# Patient Record
Sex: FEMALE | NOT HISPANIC OR LATINO | Employment: FULL TIME | URBAN - METROPOLITAN AREA
[De-identification: names, ages, dates, MRNs, and addresses within clinical notes are randomized per-mention and may not be internally consistent; named-entity substitution may affect disease eponyms.]

---

## 2017-03-08 ENCOUNTER — HOSPITAL ENCOUNTER (OUTPATIENT)
Dept: FAMILY MEDICINE CLINIC | Facility: CLINIC | Age: 27
Setting detail: SPECIMEN
Discharge: HOME | End: 2017-03-08

## 2017-03-08 LAB
ALBUMIN SERPL-MCNC: 3.6 G/DL (ref 3.5–4.8)
ALBUMIN/GLOB SERPL: 1.1 {RATIO} (ref 1–1.7)
ALP SERPL-CCNC: 44 IU/L (ref 32–91)
ALT SERPL-CCNC: 29 IU/L (ref 14–54)
ANION GAP SERPL CALC-SCNC: 14.7 MMOL/L (ref 10–20)
AST SERPL-CCNC: 34 IU/L (ref 15–41)
BASOPHILS # BLD AUTO: 0.1 10*3/UL (ref 0–0.2)
BASOPHILS NFR BLD AUTO: 1 % (ref 0–2)
BILIRUB SERPL-MCNC: 0.4 MG/DL (ref 0.3–1.2)
BUN SERPL-MCNC: 6 MG/DL (ref 8–20)
BUN/CREAT SERPL: 10 (ref 5.4–26.2)
CALCIUM SERPL-MCNC: 9.2 MG/DL (ref 8.9–10.3)
CHLORIDE SERPL-SCNC: 103 MMOL/L (ref 101–111)
CONV CO2: 25 MMOL/L (ref 22–32)
CONV TOTAL PROTEIN: 6.8 G/DL (ref 6.1–7.9)
CREAT UR-MCNC: 0.6 MG/DL (ref 0.4–1)
DIFFERENTIAL METHOD BLD: (no result)
EOSINOPHIL # BLD AUTO: 0.3 10*3/UL (ref 0–0.3)
EOSINOPHIL # BLD AUTO: 3 % (ref 0–3)
ERYTHROCYTE [DISTWIDTH] IN BLOOD BY AUTOMATED COUNT: 13.7 % (ref 11.5–14.5)
GLOBULIN UR ELPH-MCNC: 3.2 G/DL (ref 2.5–3.8)
GLUCOSE SERPL-MCNC: 145 MG/DL (ref 65–99)
HCT VFR BLD AUTO: 39.2 % (ref 35–49)
HGB BLD-MCNC: 13.4 G/DL (ref 12–15)
LYMPHOCYTES # BLD AUTO: 3.7 10*3/UL (ref 0.8–4.8)
LYMPHOCYTES NFR BLD AUTO: 37 % (ref 18–42)
MCH RBC QN AUTO: 28.8 PG (ref 26–32)
MCHC RBC AUTO-ENTMCNC: 34 G/DL (ref 32–36)
MCV RBC AUTO: 84.7 FL (ref 80–94)
MONOCYTES # BLD AUTO: 0.5 10*3/UL (ref 0.1–1.3)
MONOCYTES NFR BLD AUTO: 6 % (ref 2–11)
NEUTROPHILS # BLD AUTO: 5.4 10*3/UL (ref 2.3–8.6)
NEUTROPHILS NFR BLD AUTO: 53 % (ref 50–75)
NRBC BLD AUTO-RTO: 0 /100{WBCS}
NRBC/RBC NFR BLD MANUAL: 0 10*3/UL
PLATELET # BLD AUTO: 324 10*3/UL (ref 150–450)
PMV BLD AUTO: 9.3 FL (ref 7.4–10.4)
POTASSIUM SERPL-SCNC: 3.7 MMOL/L (ref 3.6–5.1)
RBC # BLD AUTO: 4.64 10*6/UL (ref 4–5.4)
SODIUM SERPL-SCNC: 139 MMOL/L (ref 136–144)
TSH SERPL-ACNC: 3.55 UIU/ML (ref 0.34–5.6)
WBC # BLD AUTO: 10 10*3/UL (ref 4.5–11.5)

## 2018-05-29 ENCOUNTER — HOSPITAL ENCOUNTER (OUTPATIENT)
Dept: FAMILY MEDICINE CLINIC | Facility: CLINIC | Age: 28
Setting detail: SPECIMEN
Discharge: HOME | End: 2018-05-29
Attending: NURSE PRACTITIONER | Admitting: NURSE PRACTITIONER

## 2018-05-29 LAB
ANION GAP SERPL CALC-SCNC: 12.6 MMOL/L (ref 10–20)
BUN SERPL-MCNC: 5 MG/DL (ref 8–20)
BUN/CREAT SERPL: 8.3 (ref 5.4–26.2)
C TRACH RRNA SPEC QL PROBE: NORMAL
CALCIUM SERPL-MCNC: 9.1 MG/DL (ref 8.9–10.3)
CHLORIDE SERPL-SCNC: 100 MMOL/L (ref 101–111)
CHOLEST SERPL-MCNC: 209 MG/DL
CHOLEST/HDLC SERPL: 5.2 {RATIO}
CONV CO2: 27 MMOL/L (ref 22–32)
CONV LDL CHOLESTEROL DIRECT: 131 MG/DL (ref 0–100)
CREAT UR-MCNC: 0.6 MG/DL (ref 0.4–1)
GLUCOSE SERPL-MCNC: 184 MG/DL (ref 65–99)
HDLC SERPL-MCNC: 40 MG/DL
LDLC/HDLC SERPL: 3.3 {RATIO}
LIPID INTERPRETATION: ABNORMAL
N GONORRHOEA RRNA SPEC QL PROBE: NORMAL
POTASSIUM SERPL-SCNC: 3.6 MMOL/L (ref 3.6–5.1)
SODIUM SERPL-SCNC: 136 MMOL/L (ref 136–144)
SPECIMEN SOURCE: NORMAL
TRIGL SERPL-MCNC: 260 MG/DL
VLDLC SERPL CALC-MCNC: 37.8 MG/DL

## 2018-12-18 ENCOUNTER — HOSPITAL ENCOUNTER (OUTPATIENT)
Dept: FAMILY MEDICINE CLINIC | Facility: CLINIC | Age: 28
Setting detail: SPECIMEN
Discharge: HOME | End: 2018-12-18
Attending: FAMILY MEDICINE | Admitting: FAMILY MEDICINE

## 2018-12-20 LAB
BACTERIA SPEC AEROBE CULT: NORMAL
Lab: NORMAL
MICRO REPORT STATUS: NORMAL
SPECIMEN SOURCE: NORMAL

## 2019-03-12 ENCOUNTER — HOSPITAL ENCOUNTER (OUTPATIENT)
Dept: OTHER | Facility: HOSPITAL | Age: 29
Discharge: HOME | End: 2019-03-12
Attending: OBSTETRICS & GYNECOLOGY | Admitting: OBSTETRICS & GYNECOLOGY

## 2019-03-12 LAB
ALBUMIN SERPL-MCNC: 4 G/DL (ref 3.5–4.8)
ALBUMIN/GLOB SERPL: 1.1 {RATIO} (ref 1–1.7)
ALP SERPL-CCNC: 54 IU/L (ref 32–91)
ALT SERPL-CCNC: 43 IU/L (ref 14–54)
ANION GAP SERPL CALC-SCNC: 17.9 MMOL/L (ref 10–20)
AST SERPL-CCNC: 43 IU/L (ref 15–41)
BASOPHILS # BLD AUTO: 0.1 10*3/UL (ref 0–0.2)
BASOPHILS NFR BLD AUTO: 1 % (ref 0–2)
BILIRUB SERPL-MCNC: 0.6 MG/DL (ref 0.3–1.2)
BUN SERPL-MCNC: 6 MG/DL (ref 8–20)
BUN/CREAT SERPL: 12 (ref 5.4–26.2)
CALCIUM SERPL-MCNC: 9 MG/DL (ref 8.9–10.3)
CHLORIDE SERPL-SCNC: 95 MMOL/L (ref 101–111)
CONV CO2: 22 MMOL/L (ref 22–32)
CONV TOTAL PROTEIN: 7.6 G/DL (ref 6.1–7.9)
CREAT UR-MCNC: 0.5 MG/DL (ref 0.4–1)
DIFFERENTIAL METHOD BLD: (no result)
EOSINOPHIL # BLD AUTO: 0.3 10*3/UL (ref 0–0.3)
EOSINOPHIL # BLD AUTO: 3 % (ref 0–3)
ERYTHROCYTE [DISTWIDTH] IN BLOOD BY AUTOMATED COUNT: 15.3 % (ref 11.5–14.5)
GLOBULIN UR ELPH-MCNC: 3.6 G/DL (ref 2.5–3.8)
GLUCOSE SERPL-MCNC: 314 MG/DL (ref 65–99)
HCT VFR BLD AUTO: 32.1 % (ref 35–49)
HGB BLD-MCNC: 10 G/DL (ref 12–15)
LYMPHOCYTES # BLD AUTO: 2.8 10*3/UL (ref 0.8–4.8)
LYMPHOCYTES NFR BLD AUTO: 31 % (ref 18–42)
MCH RBC QN AUTO: 24.1 PG (ref 26–32)
MCHC RBC AUTO-ENTMCNC: 31.2 G/DL (ref 32–36)
MCV RBC AUTO: 77.2 FL (ref 80–94)
MONOCYTES # BLD AUTO: 0.6 10*3/UL (ref 0.1–1.3)
MONOCYTES NFR BLD AUTO: 7 % (ref 2–11)
NEUTROPHILS # BLD AUTO: 5.4 10*3/UL (ref 2.3–8.6)
NEUTROPHILS NFR BLD AUTO: 58 % (ref 50–75)
NRBC BLD AUTO-RTO: 0 /100{WBCS}
NRBC/RBC NFR BLD MANUAL: 0 10*3/UL
PLATELET # BLD AUTO: 415 10*3/UL (ref 150–450)
PMV BLD AUTO: 8.3 FL (ref 7.4–10.4)
POTASSIUM SERPL-SCNC: 3.9 MMOL/L (ref 3.6–5.1)
RBC # BLD AUTO: 4.16 10*6/UL (ref 4–5.4)
SODIUM SERPL-SCNC: 131 MMOL/L (ref 136–144)
WBC # BLD AUTO: 9.2 10*3/UL (ref 4.5–11.5)

## 2019-03-18 ENCOUNTER — HOSPITAL ENCOUNTER (OUTPATIENT)
Dept: OTHER | Facility: HOSPITAL | Age: 29
Setting detail: SPECIMEN
Discharge: HOME | End: 2019-03-18
Attending: OBSTETRICS & GYNECOLOGY | Admitting: OBSTETRICS & GYNECOLOGY

## 2019-07-31 ENCOUNTER — OFFICE VISIT (OUTPATIENT)
Dept: FAMILY MEDICINE CLINIC | Facility: CLINIC | Age: 29
End: 2019-07-31

## 2019-07-31 ENCOUNTER — TELEPHONE (OUTPATIENT)
Dept: RHEUMATOLOGY | Facility: CLINIC | Age: 29
End: 2019-07-31

## 2019-07-31 ENCOUNTER — LAB (OUTPATIENT)
Dept: FAMILY MEDICINE CLINIC | Facility: CLINIC | Age: 29
End: 2019-07-31

## 2019-07-31 VITALS
DIASTOLIC BLOOD PRESSURE: 114 MMHG | WEIGHT: 215.3 LBS | OXYGEN SATURATION: 98 % | SYSTOLIC BLOOD PRESSURE: 167 MMHG | BODY MASS INDEX: 34.6 KG/M2 | HEART RATE: 105 BPM | HEIGHT: 66 IN

## 2019-07-31 DIAGNOSIS — N94.10 DYSPAREUNIA, FEMALE: ICD-10-CM

## 2019-07-31 DIAGNOSIS — I10 HYPERTENSION, UNSPECIFIED TYPE: ICD-10-CM

## 2019-07-31 DIAGNOSIS — L02.91 ABSCESS: ICD-10-CM

## 2019-07-31 DIAGNOSIS — Z00.00 PREVENTATIVE HEALTH CARE: ICD-10-CM

## 2019-07-31 DIAGNOSIS — E11.65 TYPE 2 DIABETES MELLITUS WITH HYPERGLYCEMIA, WITHOUT LONG-TERM CURRENT USE OF INSULIN (HCC): ICD-10-CM

## 2019-07-31 DIAGNOSIS — E66.9 CLASS 1 OBESITY WITH BODY MASS INDEX (BMI) OF 34.0 TO 34.9 IN ADULT, UNSPECIFIED OBESITY TYPE, UNSPECIFIED WHETHER SERIOUS COMORBIDITY PRESENT: ICD-10-CM

## 2019-07-31 DIAGNOSIS — Z00.00 PREVENTATIVE HEALTH CARE: Primary | ICD-10-CM

## 2019-07-31 PROBLEM — L72.3 INFECTED SEBACEOUS CYST: Status: ACTIVE | Noted: 2019-06-06

## 2019-07-31 PROBLEM — Z11.8 SPECIAL SCREENING EXAMINATION FOR OTHER SPECIFIED CHLAMYDIAL DISEASES: Status: ACTIVE | Noted: 2018-05-29

## 2019-07-31 PROBLEM — G44.209 TENSION HEADACHE: Status: ACTIVE | Noted: 2017-03-08

## 2019-07-31 PROBLEM — L08.9 INFECTED SEBACEOUS CYST: Status: ACTIVE | Noted: 2019-06-06

## 2019-07-31 LAB
ALBUMIN SERPL-MCNC: 3.9 G/DL (ref 3.5–4.8)
ALBUMIN/GLOB SERPL: 1 G/DL (ref 1–1.7)
ALP SERPL-CCNC: 69 U/L (ref 32–91)
ALT SERPL W P-5'-P-CCNC: 99 U/L (ref 14–54)
ANION GAP SERPL CALCULATED.3IONS-SCNC: 15.8 MMOL/L (ref 5–15)
ARTICHOKE IGE QN: 162 MG/DL (ref 0–100)
AST SERPL-CCNC: 82 U/L (ref 15–41)
BASOPHILS # BLD AUTO: 0.1 10*3/MM3 (ref 0–0.2)
BASOPHILS NFR BLD AUTO: 1.1 % (ref 0–1.5)
BILIRUB SERPL-MCNC: 0.7 MG/DL (ref 0.3–1.2)
BUN BLD-MCNC: 4 MG/DL (ref 8–20)
BUN/CREAT SERPL: 6.7 (ref 5.4–26.2)
C TRACH RRNA CVX QL NAA+PROBE: NOT DETECTED
CALCIUM SPEC-SCNC: 9.3 MG/DL (ref 8.9–10.3)
CHLORIDE SERPL-SCNC: 97 MMOL/L (ref 101–111)
CHOLEST SERPL-MCNC: 210 MG/DL
CLUE CELLS SPEC QL WET PREP: ABNORMAL
CO2 SERPL-SCNC: 25 MMOL/L (ref 22–32)
CREAT BLD-MCNC: 0.6 MG/DL (ref 0.4–1)
DEPRECATED RDW RBC AUTO: 46.4 FL (ref 37–54)
EOSINOPHIL # BLD AUTO: 0.2 10*3/MM3 (ref 0–0.4)
EOSINOPHIL NFR BLD AUTO: 3.3 % (ref 0.3–6.2)
ERYTHROCYTE [DISTWIDTH] IN BLOOD BY AUTOMATED COUNT: 16.8 % (ref 12.3–15.4)
GFR SERPL CREATININE-BSD FRML MDRD: 118 ML/MIN/1.73
GFR SERPL CREATININE-BSD FRML MDRD: 143 ML/MIN/1.73
GLOBULIN UR ELPH-MCNC: 3.9 GM/DL (ref 2.5–3.8)
GLUCOSE BLD-MCNC: 344 MG/DL (ref 65–99)
HBA1C MFR BLD: 12.5 % (ref 3.5–5.6)
HCT VFR BLD AUTO: 44 % (ref 34–46.6)
HDLC SERPL QL: 5.12
HDLC SERPL-MCNC: 41 MG/DL
HGB BLD-MCNC: 14.2 G/DL (ref 12–15.9)
HYDATID CYST SPEC WET PREP: ABNORMAL
LDLC/HDLC SERPL: 3.32 {RATIO}
LYMPHOCYTES # BLD AUTO: 2.8 10*3/MM3 (ref 0.7–3.1)
LYMPHOCYTES NFR BLD AUTO: 40.6 % (ref 19.6–45.3)
MCH RBC QN AUTO: 25.2 PG (ref 26.6–33)
MCHC RBC AUTO-ENTMCNC: 32.2 G/DL (ref 31.5–35.7)
MCV RBC AUTO: 78.3 FL (ref 79–97)
MONOCYTES # BLD AUTO: 0.5 10*3/MM3 (ref 0.1–0.9)
MONOCYTES NFR BLD AUTO: 7.1 % (ref 5–12)
N GONORRHOEA RRNA SPEC QL NAA+PROBE: NOT DETECTED
NEUTROPHILS # BLD AUTO: 3.4 10*3/MM3 (ref 1.7–7)
NEUTROPHILS NFR BLD AUTO: 47.9 % (ref 42.7–76)
NRBC BLD AUTO-RTO: 0.2 /100 WBC (ref 0–0.2)
PLATELET # BLD AUTO: 350 10*3/MM3 (ref 140–450)
PMV BLD AUTO: 8.9 FL (ref 6–12)
POTASSIUM BLD-SCNC: 3.8 MMOL/L (ref 3.6–5.1)
PROT SERPL-MCNC: 7.8 G/DL (ref 6.1–7.9)
RBC # BLD AUTO: 5.62 10*6/MM3 (ref 3.77–5.28)
SODIUM BLD-SCNC: 134 MMOL/L (ref 136–144)
T VAGINALIS SPEC QL WET PREP: ABNORMAL
TRIGL SERPL-MCNC: 165 MG/DL
TSH SERPL DL<=0.05 MIU/L-ACNC: 1.48 MIU/ML (ref 0.34–5.6)
VLDLC SERPL-MCNC: 33 MG/DL
WBC NRBC COR # BLD: 7 10*3/MM3 (ref 3.4–10.8)
WBC SPEC QL WET PREP: ABNORMAL
YEAST GENITAL QL WET PREP: ABNORMAL

## 2019-07-31 PROCEDURE — 99395 PREV VISIT EST AGE 18-39: CPT | Performed by: NURSE PRACTITIONER

## 2019-07-31 PROCEDURE — 83036 HEMOGLOBIN GLYCOSYLATED A1C: CPT | Performed by: NURSE PRACTITIONER

## 2019-07-31 PROCEDURE — 87210 SMEAR WET MOUNT SALINE/INK: CPT | Performed by: NURSE PRACTITIONER

## 2019-07-31 PROCEDURE — 87491 CHLMYD TRACH DNA AMP PROBE: CPT | Performed by: NURSE PRACTITIONER

## 2019-07-31 PROCEDURE — 87591 N.GONORRHOEAE DNA AMP PROB: CPT | Performed by: NURSE PRACTITIONER

## 2019-07-31 PROCEDURE — 80053 COMPREHEN METABOLIC PANEL: CPT | Performed by: NURSE PRACTITIONER

## 2019-07-31 PROCEDURE — 85025 COMPLETE CBC W/AUTO DIFF WBC: CPT | Performed by: NURSE PRACTITIONER

## 2019-07-31 PROCEDURE — 87077 CULTURE AEROBIC IDENTIFY: CPT | Performed by: NURSE PRACTITIONER

## 2019-07-31 PROCEDURE — 84443 ASSAY THYROID STIM HORMONE: CPT | Performed by: NURSE PRACTITIONER

## 2019-07-31 PROCEDURE — 80061 LIPID PANEL: CPT | Performed by: NURSE PRACTITIONER

## 2019-07-31 PROCEDURE — 87070 CULTURE OTHR SPECIMN AEROBIC: CPT | Performed by: NURSE PRACTITIONER

## 2019-07-31 PROCEDURE — 87205 SMEAR GRAM STAIN: CPT | Performed by: NURSE PRACTITIONER

## 2019-07-31 PROCEDURE — 36415 COLL VENOUS BLD VENIPUNCTURE: CPT

## 2019-07-31 PROCEDURE — 87186 SC STD MICRODIL/AGAR DIL: CPT | Performed by: NURSE PRACTITIONER

## 2019-07-31 RX ORDER — AMLODIPINE BESYLATE 10 MG/1
10 TABLET ORAL DAILY
Qty: 90 TABLET | Refills: 1 | Status: SHIPPED | OUTPATIENT
Start: 2019-07-31 | End: 2019-12-18

## 2019-07-31 RX ORDER — ESTRADIOL 0.5 MG/1
TABLET ORAL
Qty: 18 TABLET | Refills: 0 | Status: SHIPPED | OUTPATIENT
Start: 2019-07-31 | End: 2019-12-18

## 2019-07-31 RX ORDER — AMLODIPINE BESYLATE 10 MG/1
10 TABLET ORAL DAILY
Qty: 30 TABLET | Refills: 2 | Status: SHIPPED | OUTPATIENT
Start: 2019-07-31 | End: 2019-07-31 | Stop reason: SDUPTHER

## 2019-07-31 RX ORDER — AMLODIPINE BESYLATE 5 MG/1
TABLET ORAL
COMMUNITY
Start: 2016-02-02 | End: 2019-07-31

## 2019-07-31 RX ORDER — ESTRADIOL 0.5 MG/1
TABLET ORAL
Qty: 15 TABLET | Refills: 1 | Status: SHIPPED | OUTPATIENT
Start: 2019-07-31 | End: 2019-07-31 | Stop reason: SDUPTHER

## 2019-07-31 NOTE — TELEPHONE ENCOUNTER
[facility] is requesting 90 day supply on pt amlodipine and estradiol.   Pt also is requesting script for whatever meter/test strips/lancets are covered per insurance. Faxed script to pharmacy.

## 2019-07-31 NOTE — TELEPHONE ENCOUNTER
Spoke with Hilda Acosta NP regarding posterior neck cyst that was infected and draining pus. Hilda asked that we see the patient in our office today. Spoke with patient and offered multiple appointment dates and times. Patient is refusing to be seen. She stated that she could not do it financially right now. Explained to the patient that this would probably get a lot worse without some type of intervention. Patient understood and said that she would call us back if she changed her mind.

## 2019-08-01 ENCOUNTER — DOCUMENTATION (OUTPATIENT)
Dept: FAMILY MEDICINE CLINIC | Facility: CLINIC | Age: 29
End: 2019-08-01

## 2019-08-01 DIAGNOSIS — E11.65 TYPE 2 DIABETES MELLITUS WITH HYPERGLYCEMIA, WITHOUT LONG-TERM CURRENT USE OF INSULIN (HCC): Primary | ICD-10-CM

## 2019-08-01 DIAGNOSIS — E11.65 TYPE 2 DIABETES MELLITUS WITH HYPERGLYCEMIA, WITHOUT LONG-TERM CURRENT USE OF INSULIN (HCC): ICD-10-CM

## 2019-08-01 DIAGNOSIS — E78.5 HYPERLIPIDEMIA, UNSPECIFIED HYPERLIPIDEMIA TYPE: ICD-10-CM

## 2019-08-01 RX ORDER — ESTRADIOL 0.5 MG/1
TABLET ORAL
Qty: 36 TABLET | Refills: 0 | OUTPATIENT
Start: 2019-08-01

## 2019-08-01 RX ORDER — PEN NEEDLE, DIABETIC 30 GX5/16"
1 NEEDLE, DISPOSABLE MISCELLANEOUS DAILY
Qty: 30 EACH | Refills: 3 | Status: SHIPPED | OUTPATIENT
Start: 2019-08-01 | End: 2021-01-14

## 2019-08-01 RX ORDER — AMLODIPINE BESYLATE 10 MG/1
10 TABLET ORAL DAILY
Qty: 90 TABLET | Refills: 2 | Status: SHIPPED | OUTPATIENT
Start: 2019-08-01 | End: 2020-04-24 | Stop reason: SDUPTHER

## 2019-08-01 RX ORDER — ATORVASTATIN CALCIUM 40 MG/1
40 TABLET, FILM COATED ORAL DAILY
Qty: 30 TABLET | Refills: 2 | Status: SHIPPED | OUTPATIENT
Start: 2019-08-01 | End: 2019-08-01 | Stop reason: SDUPTHER

## 2019-08-01 RX ORDER — ATORVASTATIN CALCIUM 40 MG/1
40 TABLET, FILM COATED ORAL DAILY
Qty: 90 TABLET | Refills: 1 | Status: SHIPPED | OUTPATIENT
Start: 2019-08-01 | End: 2020-02-19

## 2019-08-02 DIAGNOSIS — L02.91 ABSCESS: Primary | ICD-10-CM

## 2019-08-02 RX ORDER — CEFDINIR 300 MG/1
300 CAPSULE ORAL 2 TIMES DAILY
Qty: 20 CAPSULE | Refills: 0 | Status: SHIPPED | OUTPATIENT
Start: 2019-08-02 | End: 2019-08-12

## 2019-08-03 LAB
BACTERIA SPEC AEROBE CULT: ABNORMAL
GRAM STN SPEC: ABNORMAL

## 2019-08-07 ENCOUNTER — TELEPHONE (OUTPATIENT)
Dept: FAMILY MEDICINE CLINIC | Facility: CLINIC | Age: 29
End: 2019-08-07

## 2019-08-07 NOTE — TELEPHONE ENCOUNTER
Pt called. She said that you had mentioned a referral to a specialist? Do you know anything about it? I do not see an order in?

## 2019-08-12 NOTE — TELEPHONE ENCOUNTER
I referred pt to our endo group on 8.1.19. They should call her. Seems like they are a little behind, so I gallegos the pt and gave her the number florin to set up her own appt.

## 2019-09-16 ENCOUNTER — OFFICE VISIT (OUTPATIENT)
Dept: NEUROLOGY | Facility: CLINIC | Age: 29
End: 2019-09-16

## 2019-09-16 VITALS
WEIGHT: 219 LBS | DIASTOLIC BLOOD PRESSURE: 88 MMHG | HEART RATE: 102 BPM | SYSTOLIC BLOOD PRESSURE: 121 MMHG | BODY MASS INDEX: 35.2 KG/M2 | HEIGHT: 66 IN

## 2019-09-16 DIAGNOSIS — G47.33 OBSTRUCTIVE SLEEP APNEA: Primary | ICD-10-CM

## 2019-09-16 DIAGNOSIS — E66.09 CLASS 1 OBESITY DUE TO EXCESS CALORIES WITH BODY MASS INDEX (BMI) OF 34.0 TO 34.9 IN ADULT, UNSPECIFIED WHETHER SERIOUS COMORBIDITY PRESENT: ICD-10-CM

## 2019-09-16 PROCEDURE — 99244 OFF/OP CNSLTJ NEW/EST MOD 40: CPT | Performed by: PSYCHIATRY & NEUROLOGY

## 2019-09-16 RX ORDER — BLOOD-GLUCOSE METER
EACH MISCELLANEOUS
Refills: 0 | COMMUNITY
Start: 2019-07-31 | End: 2022-09-29 | Stop reason: SDUPTHER

## 2019-09-16 RX ORDER — BLOOD SUGAR DIAGNOSTIC
STRIP MISCELLANEOUS 2 TIMES DAILY
Refills: 3 | COMMUNITY
Start: 2019-07-31 | End: 2022-09-29 | Stop reason: SDUPTHER

## 2019-09-16 NOTE — PROGRESS NOTES
Sleep Medicine initial Consultation    Yuko Lopez  : 1990  29 y.o. female   Date of Service: 2019  Referring provider: Radha Meneses MD    New sleep self referred for snoring. Patient had a hysterectomy in march and had allot of trouble with breathing. Neck measures 17 inches.     History Of Present Illness:   Miss Yuko Lopez  is a 29 y.o. right handed  female patient has a H/O HTN is here for the evaluation of Snoring, Excessive Daytime Sleepiness, Chronic Fatigue, Insomnia and Disturbed Sleep. Wakes self snoring. Snores loudly.     The patient c/o excessive daytime sleepiness, , chronic fatigue and There is no H/O sleep paralysis, hypnagogic hallucinations or cataplexy..  There is no history of hypnagogic hallucinations, sleep paralysis or cataplexy.    The patient complains of snoring loud in all sleeping positions, has witnessed episodes of sleep apnea, awakened gasping for breath, wakened coughing, choking, or respiratory discomfort, has morning headaches, has dry mouth or sore mouth when he wakes up, excessive sweating during sleep and has gained 30 lbs of weight the the last 5 years.    The patient complains of discomfort on a creepy crawly feeling in legs when resting or relaxing and this may partially or completely improved by stretching or moving. Not very often, treatment not needed.     The patient complains of difficulty falling asleep, difficulty staying asleep, frequent awakenings 2-3, has restless sleep, Does not feel rested even after a long sleep and Still feels sleepy even when increasing sleep time.    The patient reports history of There is no h/O sleepwalking or bedwetting or nightmares or sleep eating or acting out dreams    Sleep schedule: Bedtime:10-11 , gets out of bed at 7-7:45, sleep latency: 2 hours, Gets about 6 hours of sleep.    [place] Sleepiness Scale score:   EPWORTH SLEEPINESS SCALE  Sitting and reading  3  WatchingTV  3  Sitting,  "inactive, in a public place  0  As a passenger in a car for 1 hour w/o a break  0  Lying down to rest in the afternoon  0  Sitting and talking to someone  0  Sitting quietly after a lunch  3  In a car, while stopped for traffic or a light  0  Total 9        9/24.  Past Medical History:   Diagnosis Date   • Hypertension    • PCOS (polycystic ovarian syndrome)    • Uterine cancer (CMS/HCC)      Past Surgical History:   Procedure Laterality Date   • HYSTERECTOMY       Current Outpatient Medications on File Prior to Visit   Medication Sig Dispense Refill   • amLODIPine (NORVASC) 10 MG tablet TAKE 1 TABLET BY MOUTH DAILY 90 tablet 2   • amLODIPine (NORVASC) 10 MG tablet TAKE 1 TABLET BY MOUTH DAILY 90 tablet 1   • atorvastatin (LIPITOR) 40 MG tablet TAKE 1 TABLET BY MOUTH DAILY 90 tablet 1   • estradiol (ESTRACE) 0.5 MG tablet INSERT ONE-HALF TABLET INTO THE VAGINA MONDAY, WEDNESDAY, AND FRIDAY 18 tablet 0   • Insulin Glargine (LANTUS SOLOSTAR) 100 UNIT/ML injection pen Inject 10 Units under the skin into the appropriate area as directed Daily. 5 pen 0   • Insulin Pen Needle (PEN NEEDLES 3/16\") 31G X 5 MM misc 1 each Daily. 30 each 3   • metFORMIN (GLUCOPHAGE) 1000 MG tablet TAKE 1 TABLET BY MOUTH TWICE DAILY WITH MEALS 180 tablet 1   • ACCU-CHEK VALENTINE PLUS test strip 2 (Two) Times a Day. for testing  3   • Blood Glucose Monitoring Suppl (ACCU-CHEK VALENTINE PLUS) w/Device kit TEST BLOOD SUGAR BID  0   • Calcium 150 MG tablet TK 1 T PO D  11     No current facility-administered medications on file prior to visit.      Allergies   Allergen Reactions   • Sulfa Antibiotics Hives   • Amoxicillin Rash     Family History   Problem Relation Age of Onset   • Colon cancer Mother    • Uterine cancer Mother      Social History     Socioeconomic History   • Marital status: Single     Spouse name: Not on file   • Number of children: Not on file   • Years of education: Not on file   • Highest education level: Not on file   Tobacco Use   • " Smoking status: Never Smoker   Substance and Sexual Activity   • Alcohol use: Yes   • Drug use: No   • Sexual activity: Yes     Review of Systems   Constitutional: Positive for fatigue. Negative for appetite change.   HENT: Positive for sinus pressure and sinus pain.    Eyes: Negative for pain and itching.   Respiratory: Negative for cough and shortness of breath.    Cardiovascular: Negative for chest pain and palpitations.   Gastrointestinal: Positive for constipation and diarrhea.   Endocrine: Negative for cold intolerance and heat intolerance.   Genitourinary: Negative for difficulty urinating and frequency.   Musculoskeletal: Positive for back pain. Negative for gait problem.   Allergic/Immunologic: Positive for environmental allergies.   Neurological: Positive for headaches. Negative for dizziness, tremors, seizures, syncope, facial asymmetry, speech difficulty, weakness, light-headedness and numbness.   Psychiatric/Behavioral: Negative for agitation and confusion.     I reviewed and addressed ROS entered by MA.    Patient examination:  Vitals:    09/16/19 1128   BP: 121/88   Pulse: 102    Body mass index is 35.35 kg/m².     Physical Exam   Constitutional: She is oriented to person, place, and time. She appears well-developed and well-nourished.   HENT:   Head: Normocephalic.   Nose: Nose normal.   Mouth/Throat: Oropharynx is clear and moist.   [place] class IV   Eyes: Conjunctivae and EOM are normal. Pupils are equal, round, and reactive to light.   Cardiovascular: Normal rate, regular rhythm and normal heart sounds.   No murmur heard.  Pulmonary/Chest: Effort normal and breath sounds normal. No respiratory distress.   Musculoskeletal: Normal range of motion. She exhibits no edema or deformity.   Neurological: She is alert and oriented to person, place, and time. No cranial nerve deficit. Coordination normal.   Psychiatric: She has a normal mood and affect. Her behavior is normal.   Vitals  reviewed.      ASSESSMENT AND PLAN:The patient has symptoms of obstructive sleep apnea syndrome with hypersomnia. We will proceed with polysomnography and treat with CPAP therapy if needed. .  Exercise diet and weight loss discussed.    Encounter Diagnoses   Name Primary?   • Obstructive sleep apnea Yes   • Class 1 obesity due to excess calories with body mass index (BMI) of 34.0 to 34.9 in adult, unspecified whether serious comorbidity present        Orders Placed This Encounter   Procedures   • Polysomnography 4 or More Parameters       Return in about 3 months (around 12/16/2019) for Recheck.    This document has been electronically signed by Joseph Seipel, MD  on September 16, 2019 11:47 AM

## 2019-09-23 ENCOUNTER — HOSPITAL ENCOUNTER (OUTPATIENT)
Dept: SLEEP MEDICINE | Facility: HOSPITAL | Age: 29
Discharge: HOME | End: 2019-09-23
Admitting: PSYCHIATRY & NEUROLOGY

## 2019-09-23 VITALS — WEIGHT: 218.92 LBS | HEIGHT: 66 IN | BODY MASS INDEX: 35.18 KG/M2

## 2019-09-23 DIAGNOSIS — G47.33 OBSTRUCTIVE SLEEP APNEA: ICD-10-CM

## 2019-09-23 PROCEDURE — 95811 POLYSOM 6/>YRS CPAP 4/> PARM: CPT

## 2019-09-25 PROCEDURE — 95811 POLYSOM 6/>YRS CPAP 4/> PARM: CPT | Performed by: PSYCHIATRY & NEUROLOGY

## 2019-10-07 ENCOUNTER — TELEPHONE (OUTPATIENT)
Dept: NEUROLOGY | Facility: CLINIC | Age: 29
End: 2019-10-07

## 2019-10-07 DIAGNOSIS — G47.33 OBSTRUCTIVE SLEEP APNEA: Primary | ICD-10-CM

## 2019-11-21 DIAGNOSIS — E11.65 TYPE 2 DIABETES MELLITUS WITH HYPERGLYCEMIA, WITHOUT LONG-TERM CURRENT USE OF INSULIN (HCC): ICD-10-CM

## 2019-11-21 RX ORDER — INSULIN GLARGINE 100 [IU]/ML
INJECTION, SOLUTION SUBCUTANEOUS
Qty: 15 ML | Refills: 0 | Status: SHIPPED | OUTPATIENT
Start: 2019-11-21 | End: 2019-12-19 | Stop reason: SDUPTHER

## 2019-11-22 DIAGNOSIS — E11.65 TYPE 2 DIABETES MELLITUS WITH HYPERGLYCEMIA, WITHOUT LONG-TERM CURRENT USE OF INSULIN (HCC): ICD-10-CM

## 2019-12-17 ENCOUNTER — OFFICE VISIT (OUTPATIENT)
Dept: NEUROLOGY | Facility: CLINIC | Age: 29
End: 2019-12-17

## 2019-12-17 VITALS
DIASTOLIC BLOOD PRESSURE: 93 MMHG | SYSTOLIC BLOOD PRESSURE: 133 MMHG | WEIGHT: 220.6 LBS | HEART RATE: 125 BPM | HEIGHT: 66 IN | BODY MASS INDEX: 35.45 KG/M2

## 2019-12-17 DIAGNOSIS — G47.33 OBSTRUCTIVE SLEEP APNEA SYNDROME: Primary | ICD-10-CM

## 2019-12-17 PROBLEM — G47.30 SLEEP APNEA: Status: ACTIVE | Noted: 2019-12-17

## 2019-12-17 PROCEDURE — 99213 OFFICE O/P EST LOW 20 MIN: CPT | Performed by: PSYCHIATRY & NEUROLOGY

## 2019-12-17 NOTE — PROGRESS NOTES
Sleep medicine follow-up visit    Yuko Lopez   1990  29 y.o. female   DATE OF SERVICE: 12/17/2019     Chief complaint:  Patient is f/u from sleep study with new CPAP machine.    On NPSG at [facility] , 09/23/2019 patient had Severe obstructive sleep apnea syndrome with apnea-hypopnea index of 86.2 per sleep hour, minimum SpO2 of 86%    The compliance data reviewed and the patient is on CPAP therapy at 82 cm/H2O and compliance data indicates excellent compliance with 16% usage for more than 4 hours with an average usage of 4 hours 5 minutes. AHI down to 2.9 with CPAP therapy and mean CPAP pressure 11.2 cm of water.  Not using with bad cold or have gi bug. Patient been sick with congestion and felt she could not breath, has a cyst on her neck and the strap irritates it.     Patient currently using a FFM and goes through [employer] for supplies. Patient feels improvement with using the machine some oc dry mouth.     Pt is having some trouble with sleep onset insomnia helped some with melatonin    Review of Systems   Constitutional: Negative for appetite change and fatigue.   HENT: Negative for sinus pressure and sinus pain.    Eyes: Negative for pain and redness.   Respiratory: Negative for cough and shortness of breath.    Cardiovascular: Negative for chest pain and palpitations.   Gastrointestinal: Negative for blood in stool and constipation.   Endocrine: Negative for cold intolerance and heat intolerance.   Genitourinary: Negative for difficulty urinating and frequency.   Musculoskeletal: Positive for back pain and neck pain.   Allergic/Immunologic: Positive for environmental allergies.   Neurological: Negative for dizziness, tremors, seizures, syncope, facial asymmetry, speech difficulty, weakness, light-headedness, numbness and headaches.   Psychiatric/Behavioral: Negative for agitation and confusion.     I reviewed and addressed ROS entered by MA.        The following portions of the patient's history were  "reviewed and updated as appropriate: allergies, current medications, past family history, past medical history, past social history, past surgical history and problem list.      Family History   Problem Relation Age of Onset   • Colon cancer Mother    • Uterine cancer Mother        Past Medical History:   Diagnosis Date   • Hypertension    • PCOS (polycystic ovarian syndrome)    • Sleep apnea    • Uterine cancer (CMS/HCC)        Social History     Socioeconomic History   • Marital status: Single     Spouse name: Not on file   • Number of children: Not on file   • Years of education: Not on file   • Highest education level: Not on file   Tobacco Use   • Smoking status: Never Smoker   • Smokeless tobacco: Never Used   Substance and Sexual Activity   • Alcohol use: Yes   • Drug use: No   • Sexual activity: Yes         Current Outpatient Medications:   •  ACCU-CHEK VALENTINE PLUS test strip, 2 (Two) Times a Day. for testing, Disp: , Rfl: 3  •  ACCU-CHEK SOFTCLIX LANCETS lancets, TEST BS BID, Disp: , Rfl: 3  •  amLODIPine (NORVASC) 10 MG tablet, TAKE 1 TABLET BY MOUTH DAILY, Disp: 90 tablet, Rfl: 2  •  amLODIPine (NORVASC) 10 MG tablet, TAKE 1 TABLET BY MOUTH DAILY, Disp: 90 tablet, Rfl: 1  •  atorvastatin (LIPITOR) 40 MG tablet, TAKE 1 TABLET BY MOUTH DAILY, Disp: 90 tablet, Rfl: 1  •  Blood Glucose Monitoring Suppl (ACCU-CHEK VALENTINE PLUS) w/Device kit, TEST BLOOD SUGAR BID, Disp: , Rfl: 0  •  Calcium 150 MG tablet, TK 1 T PO D, Disp: , Rfl: 11  •  clotrimazole-betamethasone (LOTRISONE) 1-0.05 % cream, CANDE EXT AA BID SPARINGLY, Disp: , Rfl: 12  •  estradiol (ESTRACE) 0.5 MG tablet, INSERT ONE-HALF TABLET INTO THE VAGINA MONDAY, WEDNESDAY, AND FRIDAY, Disp: 18 tablet, Rfl: 0  •  Insulin Pen Needle (PEN NEEDLES 3/16\") 31G X 5 MM misc, 1 each Daily., Disp: 30 each, Rfl: 3  •  LANTUS SOLOSTAR 100 UNIT/ML injection pen, INJECT 10 UNITS UNDER THE SKIN INTO THE APPROPRIATE AREA DAILY AS DIRECTED, Disp: 15 mL, Rfl: 0  •  metFORMIN " (GLUCOPHAGE) 1000 MG tablet, Take 1 tablet by mouth 2 (Two) Times a Day With Meals., Disp: 180 tablet, Rfl: 1    Allergies   Allergen Reactions   • Sulfa Antibiotics Hives   • Amoxicillin Rash        PHYSICAL EXAMINATION:  Vitals:    12/17/19 1434   BP: 133/93   Pulse: (!) 125      Body mass index is 35.61 kg/m².       HEENT: Normal.      EXTREMITIES: No edema.     IMPRESSION:     Patient with obstructive sleep apnea syndrome with hypersomnia successfully treated with CPAP therapy and is compliant and benefiting from it.     The CPAP download is good regarding control of events and mask leaks.    Lose weight.     RECOMMENDATIONS:   1. Continue present CPAP.   2. Follow up 1 year.     EPWORTH SLEEPINESS SCALE  Sitting and reading  0  WatchingTV  2  Sitting, inactive, in a public place  0  As a passenger in a car for 1 hour w/o a break  0  Lying down to rest in the afternoon  0  Sitting and talking to someone  0  Sitting quietly after a lunch  0  In a car, while stopped for traffic or a light  0  Total 2        This document has been electronically signed by Joseph Seipel, MD on December 17, 2019 2:52 PM

## 2019-12-18 ENCOUNTER — OFFICE VISIT (OUTPATIENT)
Dept: ENDOCRINOLOGY | Facility: CLINIC | Age: 29
End: 2019-12-18

## 2019-12-18 VITALS
WEIGHT: 221 LBS | HEIGHT: 66 IN | DIASTOLIC BLOOD PRESSURE: 100 MMHG | OXYGEN SATURATION: 97 % | BODY MASS INDEX: 35.52 KG/M2 | HEART RATE: 119 BPM | SYSTOLIC BLOOD PRESSURE: 140 MMHG

## 2019-12-18 DIAGNOSIS — I10 HYPERTENSION, UNSPECIFIED TYPE: ICD-10-CM

## 2019-12-18 DIAGNOSIS — E11.65 TYPE 2 DIABETES MELLITUS WITH HYPERGLYCEMIA, WITHOUT LONG-TERM CURRENT USE OF INSULIN (HCC): Primary | ICD-10-CM

## 2019-12-18 LAB — GLUCOSE BLDC GLUCOMTR-MCNC: 327 MG/DL (ref 70–130)

## 2019-12-18 PROCEDURE — 82962 GLUCOSE BLOOD TEST: CPT | Performed by: INTERNAL MEDICINE

## 2019-12-18 PROCEDURE — 99213 OFFICE O/P EST LOW 20 MIN: CPT | Performed by: INTERNAL MEDICINE

## 2019-12-18 RX ORDER — VITAMIN B COMPLEX
CAPSULE ORAL DAILY
COMMUNITY
End: 2021-06-29

## 2019-12-18 NOTE — ASSESSMENT & PLAN NOTE
Hypertension is not controlled.  Add lisinopril 10 mg daily and continue amlodipine 10 mg tablet daily.

## 2019-12-18 NOTE — ASSESSMENT & PLAN NOTE
Discussed the diet modification with the patient.  Advised to increase the Lantus to 30 units at bedtime and titrate the insulin 3 to 5 units every 3days keep the fasting blood glucose less than 120.  Continue metformin.  Add Ozempic 0.25 mg weekly for 4 weeks and then increase to 0.5 mg weekly.  Discussed the adverse effects of the Ozempic including pancreatitis, the patient was advised to stop the medication if develops any abdominal pain and vomiting or diarrhea and call the office immediately.  Follow-up in 4 to 6 weeks.  Advised to maintain a blood glucose log and bring on every visit.

## 2019-12-18 NOTE — PROGRESS NOTES
Subjective   Yuko Lopez is a 29 y.o. female.     History of Present Illness ;  This is a 29-year-old female who has a history of polycystic ovarian syndrome and type 2 diabetes mellitus, referred for the evaluation of uncontrolled diabetes mellitus.  Her A1c was 7.2% in May 2019, but in July 2019 - A1c was over 12%.  She was diagnosed with a uterine cancer and underwent total hysterectomy.  She is currently taking metformin 1000 milligrams twice daily and Lantus 10 units daily in the morning.  She reports polyuria, polydipsia, weight loss, and recurrent yeast infections.  Reports her blood sugars been running between 200 to 300 mg/dL.  Also reports blurred vision.    The following portions of the patient's history were reviewed and updated as appropriate: allergies, current medications, past family history, past medical history, past social history, past surgical history and problem list.    Review of Systems   Constitutional: + weight loss.   Eyes: Negative.    Respiratory: Negative.    Cardiovascular: Negative.    Gastrointestinal: Negative.    Endocrine: + polyuria - + Polydipsia   Genitourinary: Negative for dysuria, frequency and urgency.   Musculoskeletal: negative for joint swelling,  Negative for myalgias.   Neurological: Negative for dizziness, light-headedness and memory problem.   Psychiatric/Behavioral: Negative for behavioral problems, hallucinations and depressed mood.       Physical Exam   Constitutional:  oriented to person, place, and time. She appears well-developed and well-nourished.   Head: Normocephalic and atraumatic.   Eyes: EOM are normal. Pupils are equal, round, and reactive to light.   Neck: Normal range of motion. Neck supple.   Cardiovascular: Normal rate, regular rhythm and normal heart sounds.   Pulmonary/Chest: Effort normal and breath sounds normal.   Abdominal: Soft. Bowel sounds are normal.   Musculoskeletal: Normal range of motion.   Neurological: She is alert and oriented  to person, place, and time.   Skin: Skin is warm.   Psychiatric: normal mood and affect.  behavior is normal. Judgment and thought content normal.       Assessment/Plan   Type 2 diabetes mellitus with hyperglycemia (CMS/[facility])  Discussed the diet modification with the patient.  Advised to increase the Lantus to 30 units at bedtime and titrate the insulin 3 to 5 units every 3days keep the fasting blood glucose less than 120.  Continue metformin.  Add Ozempic 0.25 mg weekly for 4 weeks and then increase to 0.5 mg weekly.  Discussed the adverse effects of the Ozempic including pancreatitis, the patient was advised to stop the medication if develops any abdominal pain and vomiting or diarrhea and call the office immediately.  Follow-up in 4 to 6 weeks.  Advised to maintain a blood glucose log and bring on every visit.    Hypertension  Hypertension is not controlled.  Add lisinopril 10 mg daily and continue amlodipine 10 mg tablet daily.

## 2019-12-19 DIAGNOSIS — E11.65 TYPE 2 DIABETES MELLITUS WITH HYPERGLYCEMIA, WITHOUT LONG-TERM CURRENT USE OF INSULIN (HCC): ICD-10-CM

## 2019-12-19 RX ORDER — LISINOPRIL 10 MG/1
10 TABLET ORAL DAILY
Qty: 90 TABLET | Refills: 1 | Status: SHIPPED | OUTPATIENT
Start: 2019-12-19 | End: 2020-01-22

## 2019-12-23 ENCOUNTER — TELEPHONE (OUTPATIENT)
Dept: ENDOCRINOLOGY | Facility: CLINIC | Age: 29
End: 2019-12-23

## 2019-12-23 NOTE — TELEPHONE ENCOUNTER
Pt called about timing and location of Ozempic and Lantus injections.  Instructed pt that both can be given at the same time but at least 2 inches apart.  Pt plans on injecting her Lantus on one side of her abdomen and Ozempic the opposite side.  Reviewed possible side effects and importance of checking her BG's and reporting low BG's. Pt verbalized an understanding.  Pt states she plans on injecting her first dose after xmas.

## 2020-01-22 ENCOUNTER — OFFICE VISIT (OUTPATIENT)
Dept: ENDOCRINOLOGY | Facility: CLINIC | Age: 30
End: 2020-01-22

## 2020-01-22 VITALS
SYSTOLIC BLOOD PRESSURE: 140 MMHG | OXYGEN SATURATION: 98 % | BODY MASS INDEX: 36.16 KG/M2 | HEIGHT: 66 IN | WEIGHT: 225 LBS | HEART RATE: 109 BPM | DIASTOLIC BLOOD PRESSURE: 95 MMHG

## 2020-01-22 DIAGNOSIS — E11.65 TYPE 2 DIABETES MELLITUS WITH HYPERGLYCEMIA, WITHOUT LONG-TERM CURRENT USE OF INSULIN (HCC): Primary | ICD-10-CM

## 2020-01-22 PROBLEM — C55 UTERINE CANCER: Status: ACTIVE | Noted: 2020-01-22

## 2020-01-22 LAB — GLUCOSE BLDC GLUCOMTR-MCNC: 186 MG/DL (ref 70–130)

## 2020-01-22 PROCEDURE — 99214 OFFICE O/P EST MOD 30 MIN: CPT | Performed by: INTERNAL MEDICINE

## 2020-01-22 PROCEDURE — 82962 GLUCOSE BLOOD TEST: CPT | Performed by: INTERNAL MEDICINE

## 2020-01-22 RX ORDER — LOSARTAN POTASSIUM AND HYDROCHLOROTHIAZIDE 12.5; 5 MG/1; MG/1
1 TABLET ORAL DAILY
Qty: 90 TABLET | Refills: 3 | Status: SHIPPED | OUTPATIENT
Start: 2020-01-22 | End: 2020-07-21 | Stop reason: SDUPTHER

## 2020-01-22 RX ORDER — ONDANSETRON 4 MG/1
TABLET, ORALLY DISINTEGRATING ORAL
COMMUNITY
Start: 2019-12-30 | End: 2020-01-22

## 2020-01-22 RX ORDER — GLIMEPIRIDE 2 MG/1
2 TABLET ORAL EVERY MORNING
Qty: 30 TABLET | Refills: 3 | Status: SHIPPED | OUTPATIENT
Start: 2020-01-22 | End: 2020-01-22

## 2020-01-22 RX ORDER — NITROFURANTOIN 25; 75 MG/1; MG/1
CAPSULE ORAL
COMMUNITY
Start: 2019-12-30 | End: 2020-01-22

## 2020-01-22 RX ORDER — GLIMEPIRIDE 2 MG/1
2 TABLET ORAL EVERY MORNING
Qty: 90 TABLET | Refills: 3 | Status: SHIPPED | OUTPATIENT
Start: 2020-01-22 | End: 2021-01-12

## 2020-01-22 RX ORDER — LOSARTAN POTASSIUM AND HYDROCHLOROTHIAZIDE 12.5; 5 MG/1; MG/1
1 TABLET ORAL DAILY
Qty: 30 TABLET | Refills: 3 | Status: SHIPPED | OUTPATIENT
Start: 2020-01-22 | End: 2020-01-22

## 2020-01-22 RX ORDER — PHENAZOPYRIDINE HYDROCHLORIDE 200 MG/1
200 TABLET, FILM COATED ORAL
COMMUNITY
Start: 2019-12-30 | End: 2020-01-22

## 2020-01-22 NOTE — PATIENT INSTRUCTIONS
Keep glucose source all the time in case of hypoglycemia, check blood sugar before driving and wear an ID for DM. Treatment of hypoglycemia were discussed with patient and all questions were answered.

## 2020-01-22 NOTE — ASSESSMENT & PLAN NOTE
Hypertension is not controlled . D/cLisinopril due to cough . Add Losartan / hctz- 50-12.5 mg per day .

## 2020-01-22 NOTE — PROGRESS NOTES
Subjective   Yuko Lopez is a 29 y.o. female.     History of Present Illness   This is a 29-year-old female who has a history of polycystic ovarian syndrome and type 2 diabetes mellitus. She is here for f/u  of uncontrolled diabetes mellitus.  Her A1c is  July 2019 - A1c was over 12%.  She was diagnosed with a uterine cancer and underwent total hysterectomy.  She is currently taking metformin 1000 milligrams twice daily and Lantus 30 units daily in the morning.   Reports her blood sugars been running about 150s .  She cannot afford Ozempic . Lantus is expensive too .  Denies polyuria - polydipisa- numbness - tingling in feet .     The following portions of the patient's history were reviewed and updated as appropriate: allergies, current medications, past family history, past medical history, past social history, past surgical history and problem list.    Review of Systems   Constitutional: Negative.    Eyes: Negative.    Respiratory: Negative.    Cardiovascular: Negative.    Gastrointestinal: Negative.    Endocrine: Negative.    Genitourinary: Negative for dysuria, frequency and urgency.   Musculoskeletal: negative for joint swelling,  Negative for myalgias.   Neurological: Negative for dizziness, light-headedness and memory problem.   Psychiatric/Behavioral: Negative for behavioral problems, hallucinations and depressed mood.       Physical Exam   Constitutional:  oriented to person, place, and time. She appears well-developed and well-nourished.   Head: Normocephalic and atraumatic.   Eyes: EOM are normal. Pupils are equal, round, and reactive to light.   Neck: Normal range of motion. Neck supple.   Cardiovascular: Normal rate, regular rhythm and normal heart sounds.   Pulmonary/Chest: Effort normal and breath sounds normal.   Abdominal: Soft. Bowel sounds are normal.   Musculoskeletal: Normal range of motion.   Neurological: She is alert and oriented to person, place, and time.   Skin: Skin is warm.    Psychiatric: normal mood and affect.  behavior is normal. Judgment and thought content normal.       Assessment/Plan     Type 2 diabetes mellitus with hyperglycemia (CMS/HCC)  Diabetes is improving . Add glimepiride - 2 mg daily in am . D/c lantus , and Ozempic - too expensive. Start soliqua - continue Metformin .     Hypertension  Hypertension is not controlled . D/cLisinopril due to cough . Add Losartan / hctz- 50-12.5 mg per day .

## 2020-01-22 NOTE — ASSESSMENT & PLAN NOTE
Diabetes is improving . Add glimepiride - 2 mg daily in am . D/c lantus , and Ozempic - too expensive. Start soliqua - continue Metformin .

## 2020-02-17 ENCOUNTER — OFFICE VISIT (OUTPATIENT)
Dept: FAMILY MEDICINE CLINIC | Facility: CLINIC | Age: 30
End: 2020-02-17

## 2020-02-17 ENCOUNTER — LAB (OUTPATIENT)
Dept: FAMILY MEDICINE CLINIC | Facility: CLINIC | Age: 30
End: 2020-02-17

## 2020-02-17 VITALS
BODY MASS INDEX: 36.32 KG/M2 | HEIGHT: 66 IN | SYSTOLIC BLOOD PRESSURE: 125 MMHG | DIASTOLIC BLOOD PRESSURE: 89 MMHG | HEART RATE: 101 BPM | WEIGHT: 226 LBS | OXYGEN SATURATION: 99 %

## 2020-02-17 DIAGNOSIS — M54.9 UPPER BACK PAIN: ICD-10-CM

## 2020-02-17 DIAGNOSIS — R10.11 RIGHT UPPER QUADRANT ABDOMINAL PAIN: ICD-10-CM

## 2020-02-17 DIAGNOSIS — R11.0 NAUSEA: ICD-10-CM

## 2020-02-17 DIAGNOSIS — R10.11 RIGHT UPPER QUADRANT ABDOMINAL PAIN: Primary | ICD-10-CM

## 2020-02-17 LAB
ALBUMIN SERPL-MCNC: 4.6 G/DL (ref 3.5–5.2)
ALBUMIN/GLOB SERPL: 1.4 G/DL
ALP SERPL-CCNC: 62 U/L (ref 39–117)
ALT SERPL W P-5'-P-CCNC: 47 U/L (ref 1–33)
AMYLASE SERPL-CCNC: 36 U/L (ref 28–100)
ANION GAP SERPL CALCULATED.3IONS-SCNC: 16.4 MMOL/L (ref 5–15)
AST SERPL-CCNC: 29 U/L (ref 1–32)
BASOPHILS # BLD AUTO: 0.08 10*3/MM3 (ref 0–0.2)
BASOPHILS NFR BLD AUTO: 1 % (ref 0–1.5)
BILIRUB SERPL-MCNC: 0.5 MG/DL (ref 0.2–1.2)
BUN BLD-MCNC: 8 MG/DL (ref 6–20)
BUN/CREAT SERPL: 15.7 (ref 7–25)
CALCIUM SPEC-SCNC: 10.1 MG/DL (ref 8.6–10.5)
CHLORIDE SERPL-SCNC: 95 MMOL/L (ref 98–107)
CO2 SERPL-SCNC: 25.6 MMOL/L (ref 22–29)
CREAT BLD-MCNC: 0.51 MG/DL (ref 0.57–1)
DEPRECATED RDW RBC AUTO: 41.2 FL (ref 37–54)
EOSINOPHIL # BLD AUTO: 0.32 10*3/MM3 (ref 0–0.4)
EOSINOPHIL NFR BLD AUTO: 4.1 % (ref 0.3–6.2)
ERYTHROCYTE [DISTWIDTH] IN BLOOD BY AUTOMATED COUNT: 14.2 % (ref 12.3–15.4)
GFR SERPL CREATININE-BSD FRML MDRD: 143 ML/MIN/1.73
GLOBULIN UR ELPH-MCNC: 3.3 GM/DL
GLUCOSE BLD-MCNC: 190 MG/DL (ref 65–99)
HCT VFR BLD AUTO: 42.2 % (ref 34–46.6)
HGB BLD-MCNC: 14.1 G/DL (ref 12–15.9)
IMM GRANULOCYTES # BLD AUTO: 0.02 10*3/MM3 (ref 0–0.05)
IMM GRANULOCYTES NFR BLD AUTO: 0.3 % (ref 0–0.5)
LIPASE SERPL-CCNC: 41 U/L (ref 13–60)
LYMPHOCYTES # BLD AUTO: 3.23 10*3/MM3 (ref 0.7–3.1)
LYMPHOCYTES NFR BLD AUTO: 41.5 % (ref 19.6–45.3)
MCH RBC QN AUTO: 27.5 PG (ref 26.6–33)
MCHC RBC AUTO-ENTMCNC: 33.4 G/DL (ref 31.5–35.7)
MCV RBC AUTO: 82.4 FL (ref 79–97)
MONOCYTES # BLD AUTO: 0.53 10*3/MM3 (ref 0.1–0.9)
MONOCYTES NFR BLD AUTO: 6.8 % (ref 5–12)
NEUTROPHILS # BLD AUTO: 3.6 10*3/MM3 (ref 1.7–7)
NEUTROPHILS NFR BLD AUTO: 46.3 % (ref 42.7–76)
NRBC BLD AUTO-RTO: 0 /100 WBC (ref 0–0.2)
PLATELET # BLD AUTO: 360 10*3/MM3 (ref 140–450)
PMV BLD AUTO: 10 FL (ref 6–12)
POTASSIUM BLD-SCNC: 3.8 MMOL/L (ref 3.5–5.2)
PROT SERPL-MCNC: 7.9 G/DL (ref 6–8.5)
RBC # BLD AUTO: 5.12 10*6/MM3 (ref 3.77–5.28)
SODIUM BLD-SCNC: 137 MMOL/L (ref 136–145)
WBC NRBC COR # BLD: 7.78 10*3/MM3 (ref 3.4–10.8)

## 2020-02-17 PROCEDURE — 80053 COMPREHEN METABOLIC PANEL: CPT | Performed by: NURSE PRACTITIONER

## 2020-02-17 PROCEDURE — 82150 ASSAY OF AMYLASE: CPT | Performed by: NURSE PRACTITIONER

## 2020-02-17 PROCEDURE — 85025 COMPLETE CBC W/AUTO DIFF WBC: CPT | Performed by: NURSE PRACTITIONER

## 2020-02-17 PROCEDURE — 36415 COLL VENOUS BLD VENIPUNCTURE: CPT

## 2020-02-17 PROCEDURE — 83690 ASSAY OF LIPASE: CPT | Performed by: NURSE PRACTITIONER

## 2020-02-17 PROCEDURE — 99214 OFFICE O/P EST MOD 30 MIN: CPT | Performed by: NURSE PRACTITIONER

## 2020-02-17 RX ORDER — ONDANSETRON 4 MG/1
4 TABLET, FILM COATED ORAL EVERY 8 HOURS PRN
Qty: 20 TABLET | Refills: 0 | Status: SHIPPED | OUTPATIENT
Start: 2020-02-17 | End: 2020-07-21 | Stop reason: SDUPTHER

## 2020-02-17 NOTE — PROGRESS NOTES
Subjective   Yuko Lopez is a 29 y.o. female.       HPI   Pt. Is here today with c/o abdominal pain; nausea and upper back pain.    Has had nausea X 3 weeks; abdominal pain is in RUQ X 1 week; has felt pressure in upper back along shoulder blades; not consistent.  Denies any CP, palpitations or SOA.  Denies vomiting.  Some loose stools but not consistently; no blood in stools.  Last BM was yesterday. No fevers.    Pain worsens after meals, especially higher fat meals.       The following portions of the patient's history were reviewed and updated as appropriate: allergies, current medications, past family history, past medical history, past social history, past surgical history and problem list.    Review of Systems   Constitutional: Negative for activity change, appetite change, chills, diaphoresis, fatigue, fever, unexpected weight gain and unexpected weight loss.   Respiratory: Negative for cough, chest tightness, shortness of breath and wheezing.    Cardiovascular: Negative for chest pain, palpitations and leg swelling.   Gastrointestinal: Positive for abdominal pain, diarrhea and nausea. Negative for abdominal distention, blood in stool, constipation, vomiting, GERD and indigestion.   Genitourinary: Negative for dysuria, flank pain, frequency, hematuria, pelvic pain and urgency.   Musculoskeletal: Positive for back pain (upper back) and myalgias. Negative for arthralgias, gait problem, joint swelling, neck pain and neck stiffness.   Skin: Negative for rash and skin lesions.   Neurological: Negative for dizziness and headache.   Hematological: Negative for adenopathy.   Psychiatric/Behavioral: Negative for depressed mood. The patient is not nervous/anxious.        Objective   Physical Exam   Constitutional: She is oriented to person, place, and time. She appears well-developed and well-nourished. No distress.   HENT:   Head: Normocephalic and atraumatic.   Eyes: Pupils are equal, round, and reactive to  light. EOM are normal.   Neck: Normal range of motion. Neck supple.   Cardiovascular: Normal rate, regular rhythm, normal heart sounds and intact distal pulses.   No murmur heard.  Pulmonary/Chest: Effort normal and breath sounds normal. No respiratory distress. She has no wheezes.   Abdominal: Soft. Bowel sounds are normal. She exhibits no distension and no mass. There is tenderness (mild RUQ tenderenss ). There is no rebound and no guarding. No hernia.   Musculoskeletal: She exhibits no edema.   Lymphadenopathy:     She has no cervical adenopathy.   Neurological: She is alert and oriented to person, place, and time.   Skin: Skin is warm and dry. Capillary refill takes less than 2 seconds. No rash noted. No erythema.   Psychiatric: She has a normal mood and affect.   Vitals reviewed.        Assessment/Plan  Answers for HPI/ROS submitted by the patient on 2/15/2020   Abdominal pain  What is the primary reason for your visit?: Abdominal Pain    Yuko was seen today for abdominal pain, vomiting and shoulder pain.    Diagnoses and all orders for this visit:    Right upper quadrant abdominal pain  Comments:  Labs today  US RUQ  HIDA scan    Orders:  -     CBC Auto Differential  -     Comprehensive Metabolic Panel  -     Amylase; Future  -     Lipase; Future  -     US Abdomen Limited; Future  -     NM HIDA Scan With Pharmacological Intervention; Future    Nausea  Comments:  Given Zofran PRN  Discussed bland diet  Orders:  -     CBC Auto Differential  -     Comprehensive Metabolic Panel  -     Amylase; Future  -     Lipase; Future  -     US Abdomen Limited; Future  -     NM HIDA Scan With Pharmacological Intervention; Future  -     ondansetron (ZOFRAN) 4 MG tablet; Take 1 tablet by mouth Every 8 (Eight) Hours As Needed for Nausea or Vomiting.    Upper back pain  Comments:  Labs today  RUQ US  HIDA scan  Orders:  -     CBC Auto Differential  -     Comprehensive Metabolic Panel  -     Amylase; Future  -     Lipase;  Future  -     US Abdomen Limited; Future  -     NM HIDA Scan With Pharmacological Intervention; Future

## 2020-02-19 DIAGNOSIS — E78.5 HYPERLIPIDEMIA, UNSPECIFIED HYPERLIPIDEMIA TYPE: ICD-10-CM

## 2020-02-19 RX ORDER — ATORVASTATIN CALCIUM 40 MG/1
40 TABLET, FILM COATED ORAL DAILY
Qty: 90 TABLET | Refills: 0 | Status: SHIPPED | OUTPATIENT
Start: 2020-02-19 | End: 2021-08-20 | Stop reason: SDUPTHER

## 2020-02-19 NOTE — PROGRESS NOTES
Patient notified. She wants to know if there was anything that shows in her blood work that would make you think something could be going on with her gallbladder? The HIDA scan and US are going to cost her about $4000 and she just wants to know if its necessary at this point or if she should wait it out and see if the symptoms get better?

## 2020-02-19 NOTE — PROGRESS NOTES
Patient notified. She is going to work on some diet changes and will contact us if there is no improvement to move forward with the US

## 2020-03-03 ENCOUNTER — APPOINTMENT (OUTPATIENT)
Dept: ULTRASOUND IMAGING | Facility: HOSPITAL | Age: 30
End: 2020-03-03

## 2020-03-03 ENCOUNTER — APPOINTMENT (OUTPATIENT)
Dept: NUCLEAR MEDICINE | Facility: HOSPITAL | Age: 30
End: 2020-03-03

## 2020-04-24 DIAGNOSIS — I10 HYPERTENSION, UNSPECIFIED TYPE: ICD-10-CM

## 2020-04-24 RX ORDER — AMLODIPINE BESYLATE 10 MG/1
10 TABLET ORAL DAILY
Qty: 90 TABLET | Refills: 0 | Status: SHIPPED | OUTPATIENT
Start: 2020-04-24 | End: 2021-11-08 | Stop reason: SDUPTHER

## 2020-05-07 ENCOUNTER — TELEMEDICINE (OUTPATIENT)
Dept: FAMILY MEDICINE CLINIC | Facility: CLINIC | Age: 30
End: 2020-05-07

## 2020-05-07 DIAGNOSIS — F33.1 MODERATE EPISODE OF RECURRENT MAJOR DEPRESSIVE DISORDER (HCC): Primary | ICD-10-CM

## 2020-05-07 DIAGNOSIS — F41.9 ANXIETY: ICD-10-CM

## 2020-05-07 PROCEDURE — 99213 OFFICE O/P EST LOW 20 MIN: CPT | Performed by: NURSE PRACTITIONER

## 2020-05-07 RX ORDER — SERTRALINE HYDROCHLORIDE 25 MG/1
25 TABLET, FILM COATED ORAL DAILY
Qty: 30 TABLET | Refills: 0 | Status: SHIPPED | OUTPATIENT
Start: 2020-05-07 | End: 2020-06-07

## 2020-05-07 RX ORDER — HYDROXYZINE PAMOATE 25 MG/1
25 CAPSULE ORAL 3 TIMES DAILY PRN
Qty: 30 CAPSULE | Refills: 0 | Status: SHIPPED | OUTPATIENT
Start: 2020-05-07 | End: 2020-12-08 | Stop reason: SDUPTHER

## 2020-05-07 NOTE — PATIENT INSTRUCTIONS
Start zoloft daily  May take 4-6 weeks to see full results  Notify me of intolerable side effects  May take vistaril as needed for anxiety  Follow up in 1 month  Continue with counseling

## 2020-05-07 NOTE — PROGRESS NOTES
Subjective   Yuko Lopez is a 30 y.o. female.     You have chosen to receive care through a telehealth visit.  Do you consent to use a video/audio connection for your medical care today? Yes    Patient is presenting via video visit today with complaints of depression and anxiety.  She has had issues with this in the past.  She has taken lexapro, but it made her too sleepy.  She had also tried wellbutrin, but didn't feel like it did much for her.  She has also taken paxil , without much relief  Pt reports that she has had to start a new job due to her previous job being closed due to the pandemic.  She is feeling overwhelmed and emotional all the time.  She has been crying daily for the past 5 days.  She does believe this is situational, but she will be in this new job until July.  She does to counseling regularly.  Denies SI or HI.  She hasnt been sleeping as well, but her work schedule has played a roll in that.           The following portions of the patient's history were reviewed and updated as appropriate: allergies, current medications, past family history, past medical history, past social history, past surgical history and problem list.    Review of Systems   Constitutional: Negative for chills, fatigue and fever.   Respiratory: Negative for chest tightness and shortness of breath.    Cardiovascular: Negative for chest pain and palpitations.   Gastrointestinal: Negative for abdominal pain, constipation, diarrhea, nausea and vomiting.   Neurological: Negative for dizziness and headache.   Psychiatric/Behavioral: Positive for sleep disturbance, depressed mood and stress. Negative for self-injury and suicidal ideas. The patient is nervous/anxious.        Objective   There were no vitals taken for this visit.  Physical Exam   Constitutional: She is oriented to person, place, and time. She appears well-developed and well-nourished. No distress.   HENT:   Head: Normocephalic and atraumatic.   Eyes: EOM are  normal.   Pulmonary/Chest: Effort normal. No respiratory distress.   Neurological: She is alert and oriented to person, place, and time.   Psychiatric: She has a normal mood and affect. Her behavior is normal. Judgment and thought content normal.   Tearful at times         Assessment/Plan     Diagnoses and all orders for this visit:    1. Moderate episode of recurrent major depressive disorder (CMS/HCC) (Primary)  Comments:  start zoloft  continue with counseling  call for worsening symptoms  follow up 1 mo  Orders:  -     sertraline (Zoloft) 25 MG tablet; Take 1 tablet by mouth Daily.  Dispense: 30 tablet; Refill: 0    2. Anxiety  Comments:  start zoloft  continue with counseling  call for worsening symptoms  follow up 1 mo  Orders:  -     hydrOXYzine pamoate (Vistaril) 25 MG capsule; Take 1 capsule by mouth 3 (Three) Times a Day As Needed for Anxiety.  Dispense: 30 capsule; Refill: 0

## 2020-07-21 DIAGNOSIS — R11.0 NAUSEA: ICD-10-CM

## 2020-07-21 RX ORDER — ONDANSETRON 4 MG/1
4 TABLET, FILM COATED ORAL EVERY 8 HOURS PRN
Qty: 30 TABLET | Refills: 0 | Status: SHIPPED | OUTPATIENT
Start: 2020-07-21 | End: 2020-07-21 | Stop reason: SDUPTHER

## 2020-10-30 ENCOUNTER — TELEMEDICINE (OUTPATIENT)
Dept: FAMILY MEDICINE CLINIC | Facility: TELEHEALTH | Age: 30
End: 2020-10-30

## 2020-10-30 VITALS — TEMPERATURE: 99 F

## 2020-10-30 DIAGNOSIS — J02.9 ACUTE PHARYNGITIS, UNSPECIFIED ETIOLOGY: Primary | ICD-10-CM

## 2020-10-30 PROCEDURE — 99213 OFFICE O/P EST LOW 20 MIN: CPT | Performed by: NURSE PRACTITIONER

## 2020-10-30 RX ORDER — CEFUROXIME AXETIL 250 MG/1
250 TABLET ORAL 2 TIMES DAILY
Qty: 20 TABLET | Refills: 0 | Status: SHIPPED | OUTPATIENT
Start: 2020-10-30 | End: 2020-11-09

## 2020-10-30 NOTE — PROGRESS NOTES
Ykuo Lopez  1990    Chief Complaint   Patient presents with   • Sore Throat     Exposure to Strep throat   • Chills       HPI  Yuko Lopez is a 30 y.o. female with complaints of chills, myalgias and sore throat. Symptoms began 1 day ago. Associated symptoms include fever. Patient denies nasal congestion, productive cough and sneezing. Patient denies a history of asthma. Patient does not smoke cigarettes. Patient states exposure to friend that had Strep and now with painful swallowing.      The following portions of the patient's history were reviewed and updated as appropriate: allergies, current medications, past social history and problem list.    Past Medical History:   Diagnosis Date   • Diabetes mellitus (CMS/[facility]) 8/1/2018    Taking metformin and lantus   • Headache    • Hyperlipidemia    • Hypertension    • PCOS (polycystic ovarian syndrome)    • Sleep apnea    • Uterine cancer (CMS/[facility])    • Visual impairment      Social History     Socioeconomic History   • Marital status: Single     Spouse name: Not on file   • Number of children: Not on file   • Years of education: Not on file   • Highest education level: Not on file   Tobacco Use   • Smoking status: Never Smoker   • Smokeless tobacco: Never Used   Substance and Sexual Activity   • Alcohol use: Yes     Alcohol/week: 1.0 standard drinks     Types: 1 Glasses of wine per week     Comment: It’s probably once a month or less   • Drug use: No   • Sexual activity: Yes     Partners: Male     Birth control/protection: Post-menopausal, Surgical       REVIEW OF SYSTEMS  History obtained from the patient  General ROS: positive for  - chills and fever  negative for - fatigue  ENT ROS: positive for - sore throat  negative for - headaches, nasal congestion, nasal discharge or sinus pain  Musculoskeletal ROS: positive for - muscle pain  negative for - gait disturbance, joint pain or muscular weakness    PHYSICAL EXAM  Temp 99 °F (37.2 °C) (Oral)      CONSTITUTIONAL:alert, well appearing, and in no distress  ENT:pharynx erythematous without exudate and sinuses nontender  LYMPH:bilateral tonsillar lymphadenopathy  CHEST:respiratory effort normal  PSYCH:alert, normal affect and speech     Diagnoses and all orders for this visit:    1. Acute pharyngitis, unspecified etiology (Primary)  -     cefuroxime (Ceftin) 250 MG tablet; Take 1 tablet by mouth 2 (Two) Times a Day for 10 days.  Dispense: 20 tablet; Refill: 0    Exposure to Strep with positive LN. See patient instructions    [facility] [address] [facility], [place] - [address] [facility] - [phone]  - [phone]   [address] [facility] [place] [address]  Phone: [phone] Fax: [phone]      This visit was performed via Telehealth. This patient has been instructed to follow up with their primary care provider or Urgent Care if their symptoms worsen or there is no improvement with the treatment provided for the illness. The patient has been instructed to go to the nearest Emergency Department for high fever, chest pain, shortness of breath or any other life-threatening symptoms.       Bernie Rocha, JACKIE  10/30/20  09:09 EDT

## 2020-10-30 NOTE — PATIENT INSTRUCTIONS
Take antibiotic until gone. Contagious until 24 hours of antibiotic completed. Change out toothbrush after 48 hours of antibiotic. Follow up if no improvement or if symptoms worsen.  Strep Throat, Adult  Strep throat is an infection in the throat that is caused by bacteria. It is common during the cold months of the year. It mostly affects children who are 5-15 years old. However, people of all ages can get it at any time of the year. This infection spreads from person to person (is contagious) through coughing, sneezing, or having close contact.  Your health care provider may use other names to describe the infection. It can be called tonsillitis (if there is swelling of the tonsils), or pharyngitis (if there is swelling at the back of the throat).  What are the causes?  This condition is caused by the Streptococcus pyogenes bacteria.  What increases the risk?  You are more likely to develop this condition if:  · You care for school-age children, or are around school-age children. Children are more likely to get strep throat and may spread it to others.  · You spend time in crowded places where the infection can spread easily.  · You have close contact with someone who has strep throat.  What are the signs or symptoms?  Symptoms of this condition include:  · Fever or chills.  · Redness, swelling, or pain in the tonsils or throat.  · Pain or difficulty when swallowing.  · White or yellow spots on the tonsils or throat.  · Tender glands in the neck and under the jaw.  · Bad smelling breath.  · Red rash all over the body. This is rare.  How is this diagnosed?  This condition is diagnosed by tests that check for the presence and the amount of bacteria that cause strep throat. They are:  · Rapid strep test. Your throat is swabbed and checked for the presence of bacteria. Results are usually ready in minutes.  · Throat culture test. Your throat is swabbed. The sample is placed in a cup that allows infections to grow.  Results are usually ready in 1 or 2 days.  How is this treated?  This condition may be treated with:  · Medicines that kill germs (antibiotics).  · Medicines that relieve pain or fever. These include:  ? Ibuprofen or acetaminophen.  ? Aspirin, only for patients who are over the age of 18.  ? Throat lozenges.  ? Throat sprays.  Follow these instructions at home:  Medicines    · Take over-the-counter and prescription medicines only as told by your health care provider.  · Take your antibiotic medicine as told by your health care provider. Do not stop taking the antibiotic even if you start to feel better.  Eating and drinking    · If you have trouble swallowing, try eating soft foods until your sore throat feels better.  · Drink enough fluid to keep your urine pale yellow.  · To help relieve pain, you may have:  ? Warm fluids, such as soup and tea.  ? Cold fluids, such as frozen desserts or popsicles.  General instructions  · Gargle with a salt-water mixture 3-4 times a day or as needed. To make a salt-water mixture, completely dissolve ½-1 tsp (3-6 g) of salt in 1 cup (237 mL) of warm water.  · Get plenty of rest.  · Stay home from work or school until you have been taking antibiotics for 24 hours.  · Avoid smoking or being around people who smoke.  · Keep all follow-up visits as told by your health care provider. This is important.  How is this prevented?    · Do not share food, drinking cups, or personal items that could cause the infection to spread to other people.  · Wash your hands well with soap and water, and make sure that all people in your house wash their hands well.  · Have family members tested if they have a sore throat or fever. They may need an antibiotic if they have strep throat.  Contact a health care provider if:  · The glands in your neck continue to get bigger.  · You develop a rash, cough, or earache.  · You cough up a thick mucus that is green, yellow-brown, or bloody.  · You have pain or  discomfort that does not get better with medicine.  · Your symptoms seem to be getting worse and not better.  · You have a fever.  Get help right away if:  · You have new symptoms, such as vomiting, severe headache, stiff or painful neck, chest pain, or shortness of breath.  · You have severe throat pain, drooling, or changes in your voice.  · You have swelling of the neck, or the skin on the neck becomes red and tender.  · You have signs of dehydration, such as tiredness (fatigue), dry mouth, and decreased urination.  · You become increasingly sleepy, or you cannot wake up completely.  · Your joints become red or painful.  Summary  · Strep throat is an infection in the throat that is caused by the Streptococcus pyogenes bacteria. This infection is spread from person to person (is contagious) through coughing, sneezing, or having close contact.  · Take your medicines, including antibiotics, as told by your health care provider. Do not stop taking the antibiotic even if you start to feel better.  · To prevent the spread of germs, wash your hands well with soap and water. Have others do the same. Do not share food, drinking cups, or personal items.  · Get help right away if you have new symptoms, such as vomiting, severe headache, stiff or painful neck, chest pain, or shortness of breath.  This information is not intended to replace advice given to you by your health care provider. Make sure you discuss any questions you have with your health care provider.  Document Released: 12/15/2001 Document Revised: 03/06/2020 Document Reviewed: 03/06/2020  [employer] Patient Education © 2020 [employer]

## 2020-12-08 DIAGNOSIS — F41.9 ANXIETY: ICD-10-CM

## 2020-12-08 RX ORDER — HYDROXYZINE PAMOATE 25 MG/1
25 CAPSULE ORAL 3 TIMES DAILY PRN
Qty: 30 CAPSULE | Refills: 0 | Status: SHIPPED | OUTPATIENT
Start: 2020-12-08 | End: 2021-08-25

## 2021-01-12 ENCOUNTER — LAB (OUTPATIENT)
Dept: LAB | Facility: HOSPITAL | Age: 31
End: 2021-01-12

## 2021-01-12 ENCOUNTER — OFFICE VISIT (OUTPATIENT)
Dept: ENDOCRINOLOGY | Facility: CLINIC | Age: 31
End: 2021-01-12

## 2021-01-12 VITALS
WEIGHT: 213 LBS | BODY MASS INDEX: 34.23 KG/M2 | HEIGHT: 66 IN | TEMPERATURE: 97.5 F | OXYGEN SATURATION: 98 % | DIASTOLIC BLOOD PRESSURE: 85 MMHG | SYSTOLIC BLOOD PRESSURE: 130 MMHG | HEART RATE: 112 BPM

## 2021-01-12 DIAGNOSIS — Z79.4 TYPE 2 DIABETES MELLITUS WITH HYPERGLYCEMIA, WITH LONG-TERM CURRENT USE OF INSULIN (HCC): Primary | ICD-10-CM

## 2021-01-12 DIAGNOSIS — Z79.4 TYPE 2 DIABETES MELLITUS WITH HYPERGLYCEMIA, WITH LONG-TERM CURRENT USE OF INSULIN (HCC): ICD-10-CM

## 2021-01-12 DIAGNOSIS — E11.65 TYPE 2 DIABETES MELLITUS WITH HYPERGLYCEMIA, WITH LONG-TERM CURRENT USE OF INSULIN (HCC): ICD-10-CM

## 2021-01-12 DIAGNOSIS — I10 ESSENTIAL HYPERTENSION: ICD-10-CM

## 2021-01-12 DIAGNOSIS — E78.2 MIXED HYPERLIPIDEMIA: ICD-10-CM

## 2021-01-12 DIAGNOSIS — E11.65 TYPE 2 DIABETES MELLITUS WITH HYPERGLYCEMIA, WITH LONG-TERM CURRENT USE OF INSULIN (HCC): Primary | ICD-10-CM

## 2021-01-12 LAB
ALBUMIN SERPL-MCNC: 4.7 G/DL (ref 3.5–5.2)
ALBUMIN/GLOB SERPL: 1.4 G/DL
ALP SERPL-CCNC: 74 U/L (ref 39–117)
ALT SERPL W P-5'-P-CCNC: 53 U/L (ref 1–33)
ANION GAP SERPL CALCULATED.3IONS-SCNC: 14.8 MMOL/L (ref 5–15)
AST SERPL-CCNC: 51 U/L (ref 1–32)
BILIRUB SERPL-MCNC: 0.4 MG/DL (ref 0–1.2)
BUN SERPL-MCNC: 10 MG/DL (ref 6–20)
BUN/CREAT SERPL: 16.4 (ref 7–25)
CALCIUM SPEC-SCNC: 10.1 MG/DL (ref 8.6–10.5)
CHLORIDE SERPL-SCNC: 97 MMOL/L (ref 98–107)
CHOLEST SERPL-MCNC: 190 MG/DL (ref 0–200)
CO2 SERPL-SCNC: 25.2 MMOL/L (ref 22–29)
CREAT SERPL-MCNC: 0.61 MG/DL (ref 0.57–1)
GFR SERPL CREATININE-BSD FRML MDRD: 115 ML/MIN/1.73
GFR SERPL CREATININE-BSD FRML MDRD: 140 ML/MIN/1.73
GLOBULIN UR ELPH-MCNC: 3.3 GM/DL
GLUCOSE BLDC GLUCOMTR-MCNC: 333 MG/DL (ref 70–105)
GLUCOSE SERPL-MCNC: 246 MG/DL (ref 65–99)
HBA1C MFR BLD: 10.6 % (ref 3.5–5.6)
HDLC SERPL-MCNC: 47 MG/DL (ref 40–60)
LDLC SERPL CALC-MCNC: 73 MG/DL (ref 0–100)
LDLC/HDLC SERPL: 1.14 {RATIO}
POTASSIUM SERPL-SCNC: 4.4 MMOL/L (ref 3.5–5.2)
PROT SERPL-MCNC: 8 G/DL (ref 6–8.5)
SODIUM SERPL-SCNC: 137 MMOL/L (ref 136–145)
TRIGL SERPL-MCNC: 447 MG/DL (ref 0–150)
VLDLC SERPL-MCNC: 70 MG/DL (ref 5–40)

## 2021-01-12 PROCEDURE — 82962 GLUCOSE BLOOD TEST: CPT | Performed by: INTERNAL MEDICINE

## 2021-01-12 PROCEDURE — 36415 COLL VENOUS BLD VENIPUNCTURE: CPT

## 2021-01-12 PROCEDURE — 80061 LIPID PANEL: CPT

## 2021-01-12 PROCEDURE — 83036 HEMOGLOBIN GLYCOSYLATED A1C: CPT

## 2021-01-12 PROCEDURE — 99214 OFFICE O/P EST MOD 30 MIN: CPT | Performed by: INTERNAL MEDICINE

## 2021-01-12 PROCEDURE — 80053 COMPREHEN METABOLIC PANEL: CPT

## 2021-01-12 PROCEDURE — 82043 UR ALBUMIN QUANTITATIVE: CPT

## 2021-01-12 PROCEDURE — 82570 ASSAY OF URINE CREATININE: CPT

## 2021-01-12 RX ORDER — METFORMIN HYDROCHLORIDE 500 MG/1
TABLET, EXTENDED RELEASE ORAL
Qty: 60 TABLET | Refills: 6 | Status: SHIPPED | OUTPATIENT
Start: 2021-01-12 | End: 2021-01-13 | Stop reason: SDUPTHER

## 2021-01-12 RX ORDER — INSULIN ASPART 100 [IU]/ML
INJECTION, SUSPENSION SUBCUTANEOUS
Qty: 5 PEN | Refills: 6 | Status: SHIPPED | OUTPATIENT
Start: 2021-01-12 | End: 2021-07-09 | Stop reason: SDUPTHER

## 2021-01-12 NOTE — PATIENT INSTRUCTIONS
RITU Medeiros  Decrease Metformin to 500 mg twice a day  Start NovoLog Mix 70/30 insulin, takes 30 units before breakfast and 15 before supper  Work on your diet and activity  Always keep glucose source in case of low blood sugar  Annual eye exam and flu vaccine  Labs today  Arrange for refresher course for diabetes.

## 2021-01-12 NOTE — PROGRESS NOTES
Endocrine Progress Note Outpatient     Patient Care Team:  Radha Meneses MD as PCP - General    Chief Complaint: Follow-up type 2 diabetes    HPI: 40-year-old female with diagnosis of type 2 diabetes, hypertension, hyperlipidemia is here for follow-up.  This is her first visit with us.    For type 2 diabetes: She is currently taking Lantus 25 units daily in the evening along with Metformin 1000 mg twice a day.  She has tried Ozempic, Soliqua in the past and could not afford those.  Her blood sugar has been running over the last few months.  Hypertension: Well-controlled  Hyperlipidemia: Currently on atorvastatin.    Past Medical History:   Diagnosis Date   • Diabetes mellitus (CMS/HCC) 8/1/2018    Taking metformin and lantus   • Headache    • Hyperlipidemia    • Hypertension    • PCOS (polycystic ovarian syndrome)    • Sleep apnea    • Uterine cancer (CMS/HCC)    • Visual impairment        Social History     Socioeconomic History   • Marital status: Single     Spouse name: Not on file   • Number of children: Not on file   • Years of education: Not on file   • Highest education level: Not on file   Tobacco Use   • Smoking status: Never Smoker   • Smokeless tobacco: Never Used   Substance and Sexual Activity   • Alcohol use: Never     Alcohol/week: 1.0 standard drinks     Types: 1 Glasses of wine per week     Frequency: Never     Comment: It’s probably once a month or less   • Drug use: No   • Sexual activity: Yes     Partners: Male     Birth control/protection: Post-menopausal, Surgical       Family History   Problem Relation Age of Onset   • Colon cancer Mother    • Uterine cancer Mother    • Cancer Mother         pancritic       Allergies   Allergen Reactions   • Lisinopril Cough   • Sulfa Antibiotics Hives   • Amoxicillin Rash       ROS:   Constitutional:  Admit fatigue, tiredness.    Eyes:  Denies change in visual acuity   HENT:  Denies nasal congestion or sore throat   Respiratory: denies cough,  shortness of breath.   Cardiovascular:  denies chest pain, edema   GI:  Denies abdominal pain, Admit nausea, vomiting.   Musculoskeletal:  Denies back pain or joint pain   Integument:  Denies dry skin and rash   Neurologic:  Denies headache, focal weakness or sensory changes   Endocrine:  Denies polyuria or polydipsia   Psychiatric:  Admit depression or anxiety      Vitals:    01/12/21 1426   BP: 130/85   Pulse: 112   Temp: 97.5 °F (36.4 °C)   SpO2: 98%       Physical Exam:  GEN: NAD, conversant  EYES: EOMI, PERRL, no conjunctival erythema  NECK: no thyromegaly, full ROM   CV: RRR, no murmurs/rubs/gallops, no peripheral edema  LUNG: CTAB, no wheezes/rales/ronchi  SKIN: no rashes, no acanthosis  MSK: no deformities, full ROM of all extremities  NEURO: no tremors, DTR normal  PSYCH: AOX3, appropriate mood, affect normal      Results Review:     I reviewed the patient's new clinical results.    Lab Results   Component Value Date    HGBA1C 12.5 (H) 07/31/2019      Lab Results   Component Value Date    GLUCOSE 190 (H) 02/17/2020    BUN 8 02/17/2020    CREATININE 0.51 (L) 02/17/2020    EGFRIFNONA 143 02/17/2020    EGFRIFAFRI 143 07/31/2019    BCR 15.7 02/17/2020    K 3.8 02/17/2020    CO2 25.6 02/17/2020    CALCIUM 10.1 02/17/2020    ALBUMIN 4.60 02/17/2020    LABIL2 1.1 03/12/2019    AST 29 02/17/2020    ALT 47 (H) 02/17/2020    CHOL 210 (H) 07/31/2019    TRIG 165 (H) 07/31/2019     (H) 07/31/2019    HDL 41 07/31/2019     Lab Results   Component Value Date    TSH 1.480 07/31/2019         Medication Review: Reviewed.       Current Outpatient Medications:   •  ACCU-CHEK VALENTINE PLUS test strip, 2 (Two) Times a Day. for testing, Disp: , Rfl: 3  •  ACCU-CHEK SOFTCLIX LANCETS lancets, TEST BS BID, Disp: , Rfl: 3  •  amLODIPine (NORVASC) 10 MG tablet, Take 1 tablet by mouth Daily., Disp: 90 tablet, Rfl: 0  •  atorvastatin (LIPITOR) 40 MG tablet, TAKE 1 TABLET BY MOUTH DAILY, Disp: 90 tablet, Rfl: 0  •  B Complex Vitamins  "(VITAMIN B COMPLEX) capsule capsule, Take  by mouth Daily., Disp: , Rfl:   •  Biotin w/ Vitamins C & E (HAIR/SKIN/NAILS PO), Take  by mouth., Disp: , Rfl:   •  Blood Glucose Monitoring Suppl (ACCU-CHEK VALENTINE PLUS) w/Device kit, TEST BLOOD SUGAR BID, Disp: , Rfl: 0  •  Calcium 150 MG tablet, 600/400mg, Disp: , Rfl: 11  •  hydrOXYzine pamoate (Vistaril) 25 MG capsule, Take 1 capsule by mouth 3 (Three) Times a Day As Needed for Anxiety., Disp: 30 capsule, Rfl: 0  •  Insulin Glargine (LANTUS SOLOSTAR) 100 UNIT/ML injection pen, Inject 30 Units under the skin into the appropriate area as directed Daily., Disp: , Rfl:   •  Insulin Pen Needle (PEN NEEDLES 3/16\") 31G X 5 MM misc, 1 each Daily., Disp: 30 each, Rfl: 3  •  losartan-hydrochlorothiazide (HYZAAR) 50-12.5 MG per tablet, Take 1 tablet by mouth Daily., Disp: , Rfl:   •  metFORMIN (GLUCOPHAGE) 1000 MG tablet, Take 1 tablet by mouth 2 (Two) Times a Day With Meals., Disp: 180 tablet, Rfl: 1  •  sertraline (ZOLOFT) 50 MG tablet, TAKE 1 TABLET BY MOUTH DAILY, Disp: 30 tablet, Rfl: 0      Assessment/Plan   1.  Diabetes mellitus type 2: Uncontrolled with significant hyperglycemia.  She is also having some nausea.  I will decrease Metformin to 500 mg extended release twice a day and DC Lantus and put her on NovoLog Mix 70/30 insulin and ask her to use 30 units before breakfast and 15 before supper.  She needs to work on her diet.  She also needs to check her blood sugars at least 3 times a day before each meal and always keep glucose source in case of low blood sugar.  We will also refer for a refresher course for diabetes.  2.  Hypertension: Well-controlled, continue current medication  3.  Hyperlipidemia: We will follow lipid panel and make further recommendations.            Eulalio Lopez MD FACE.                  "

## 2021-01-13 LAB
ALBUMIN UR-MCNC: 50 MG/DL
CREAT UR-MCNC: 88.4 MG/DL
MICROALBUMIN/CREAT UR: 565.6 MG/G

## 2021-01-13 RX ORDER — METFORMIN HYDROCHLORIDE 500 MG/1
TABLET, EXTENDED RELEASE ORAL
Qty: 180 TABLET | Refills: 2 | Status: SHIPPED | OUTPATIENT
Start: 2021-01-13 | End: 2021-07-09 | Stop reason: SDUPTHER

## 2021-01-14 ENCOUNTER — TELEPHONE (OUTPATIENT)
Dept: ENDOCRINOLOGY | Facility: CLINIC | Age: 31
End: 2021-01-14

## 2021-01-14 DIAGNOSIS — E11.65 TYPE 2 DIABETES MELLITUS WITH HYPERGLYCEMIA, WITHOUT LONG-TERM CURRENT USE OF INSULIN (HCC): ICD-10-CM

## 2021-01-14 RX ORDER — FLURBIPROFEN SODIUM 0.3 MG/ML
SOLUTION/ DROPS OPHTHALMIC
Qty: 100 EACH | Refills: 0 | Status: SHIPPED | OUTPATIENT
Start: 2021-01-14 | End: 2022-09-29 | Stop reason: SDUPTHER

## 2021-01-14 NOTE — TELEPHONE ENCOUNTER
Patient called back and said she has a coupon that will let her get 2 boxes of it for $99. I spoke with the pharmacy and they are able to go ahead and do that off of the prescription we sent in on January 12th. No further action needed at this time.

## 2021-01-14 NOTE — TELEPHONE ENCOUNTER
States the Novolog 70/30 flexpen is too expensive. It would be $600. She is asking if there is an alternative you can send in that might be cheaper.

## 2021-01-18 DIAGNOSIS — I10 ESSENTIAL HYPERTENSION: ICD-10-CM

## 2021-01-18 DIAGNOSIS — E11.65 TYPE 2 DIABETES MELLITUS WITH HYPERGLYCEMIA, WITH LONG-TERM CURRENT USE OF INSULIN (HCC): Primary | ICD-10-CM

## 2021-01-18 DIAGNOSIS — Z79.4 TYPE 2 DIABETES MELLITUS WITH HYPERGLYCEMIA, WITH LONG-TERM CURRENT USE OF INSULIN (HCC): Primary | ICD-10-CM

## 2021-01-18 DIAGNOSIS — E78.2 MIXED HYPERLIPIDEMIA: ICD-10-CM

## 2021-07-07 ENCOUNTER — LAB (OUTPATIENT)
Dept: LAB | Facility: HOSPITAL | Age: 31
End: 2021-07-07

## 2021-07-07 DIAGNOSIS — Z79.4 TYPE 2 DIABETES MELLITUS WITH HYPERGLYCEMIA, WITH LONG-TERM CURRENT USE OF INSULIN (HCC): ICD-10-CM

## 2021-07-07 DIAGNOSIS — I10 ESSENTIAL HYPERTENSION: ICD-10-CM

## 2021-07-07 DIAGNOSIS — E78.2 MIXED HYPERLIPIDEMIA: ICD-10-CM

## 2021-07-07 DIAGNOSIS — E11.65 TYPE 2 DIABETES MELLITUS WITH HYPERGLYCEMIA, WITH LONG-TERM CURRENT USE OF INSULIN (HCC): ICD-10-CM

## 2021-07-07 LAB
ALBUMIN SERPL-MCNC: 4.6 G/DL (ref 3.5–5.2)
ALBUMIN/GLOB SERPL: 1.5 G/DL
ALP SERPL-CCNC: 72 U/L (ref 39–117)
ALT SERPL W P-5'-P-CCNC: 29 U/L (ref 1–33)
ANION GAP SERPL CALCULATED.3IONS-SCNC: 10.5 MMOL/L (ref 5–15)
AST SERPL-CCNC: 23 U/L (ref 1–32)
BILIRUB SERPL-MCNC: 0.4 MG/DL (ref 0–1.2)
BUN SERPL-MCNC: 10 MG/DL (ref 6–20)
BUN/CREAT SERPL: 16.7 (ref 7–25)
CALCIUM SPEC-SCNC: 9.2 MG/DL (ref 8.6–10.5)
CHLORIDE SERPL-SCNC: 100 MMOL/L (ref 98–107)
CHOLEST SERPL-MCNC: 186 MG/DL (ref 0–200)
CO2 SERPL-SCNC: 26.5 MMOL/L (ref 22–29)
CREAT SERPL-MCNC: 0.6 MG/DL (ref 0.57–1)
GFR SERPL CREATININE-BSD FRML MDRD: 117 ML/MIN/1.73
GFR SERPL CREATININE-BSD FRML MDRD: 141 ML/MIN/1.73
GLOBULIN UR ELPH-MCNC: 3.1 GM/DL
GLUCOSE SERPL-MCNC: 306 MG/DL (ref 65–99)
HBA1C MFR BLD: 11.3 % (ref 3.5–5.6)
HDLC SERPL-MCNC: 46 MG/DL (ref 40–60)
LDLC SERPL CALC-MCNC: 102 MG/DL (ref 0–100)
LDLC/HDLC SERPL: 2.09 {RATIO}
POTASSIUM SERPL-SCNC: 4.4 MMOL/L (ref 3.5–5.2)
PROT SERPL-MCNC: 7.7 G/DL (ref 6–8.5)
SODIUM SERPL-SCNC: 137 MMOL/L (ref 136–145)
TRIGL SERPL-MCNC: 219 MG/DL (ref 0–150)
VLDLC SERPL-MCNC: 38 MG/DL (ref 5–40)

## 2021-07-07 PROCEDURE — 36415 COLL VENOUS BLD VENIPUNCTURE: CPT

## 2021-07-07 PROCEDURE — 80053 COMPREHEN METABOLIC PANEL: CPT

## 2021-07-07 PROCEDURE — 83036 HEMOGLOBIN GLYCOSYLATED A1C: CPT

## 2021-07-07 PROCEDURE — 80061 LIPID PANEL: CPT

## 2021-07-09 ENCOUNTER — OFFICE VISIT (OUTPATIENT)
Dept: ENDOCRINOLOGY | Facility: CLINIC | Age: 31
End: 2021-07-09

## 2021-07-09 VITALS
SYSTOLIC BLOOD PRESSURE: 130 MMHG | HEIGHT: 66 IN | HEART RATE: 109 BPM | DIASTOLIC BLOOD PRESSURE: 75 MMHG | OXYGEN SATURATION: 99 % | BODY MASS INDEX: 35.84 KG/M2 | WEIGHT: 223 LBS

## 2021-07-09 DIAGNOSIS — Z79.4 TYPE 2 DIABETES MELLITUS WITH HYPERGLYCEMIA, WITH LONG-TERM CURRENT USE OF INSULIN (HCC): Primary | ICD-10-CM

## 2021-07-09 DIAGNOSIS — I10 ESSENTIAL HYPERTENSION: ICD-10-CM

## 2021-07-09 DIAGNOSIS — E11.65 TYPE 2 DIABETES MELLITUS WITH HYPERGLYCEMIA, WITH LONG-TERM CURRENT USE OF INSULIN (HCC): Primary | ICD-10-CM

## 2021-07-09 DIAGNOSIS — E66.09 CLASS 2 OBESITY DUE TO EXCESS CALORIES WITHOUT SERIOUS COMORBIDITY WITH BODY MASS INDEX (BMI) OF 36.0 TO 36.9 IN ADULT: ICD-10-CM

## 2021-07-09 DIAGNOSIS — E78.2 MIXED HYPERLIPIDEMIA: ICD-10-CM

## 2021-07-09 PROBLEM — C54.1 MALIGNANT NEOPLASM OF ENDOMETRIUM OF CORPUS UTERI (HCC): Status: ACTIVE | Noted: 2021-03-30

## 2021-07-09 LAB — GLUCOSE BLDC GLUCOMTR-MCNC: 211 MG/DL (ref 70–105)

## 2021-07-09 PROCEDURE — 82962 GLUCOSE BLOOD TEST: CPT | Performed by: INTERNAL MEDICINE

## 2021-07-09 PROCEDURE — 99214 OFFICE O/P EST MOD 30 MIN: CPT | Performed by: INTERNAL MEDICINE

## 2021-07-09 RX ORDER — FLUCONAZOLE 200 MG/1
200 TABLET ORAL
COMMUNITY
Start: 2021-07-07 | End: 2022-07-29

## 2021-07-09 RX ORDER — LOSARTAN POTASSIUM AND HYDROCHLOROTHIAZIDE 12.5; 5 MG/1; MG/1
1 TABLET ORAL DAILY
COMMUNITY
End: 2021-07-20 | Stop reason: SDUPTHER

## 2021-07-09 RX ORDER — VITAMIN B COMPLEX
CAPSULE ORAL DAILY
COMMUNITY
End: 2021-07-09 | Stop reason: HOSPADM

## 2021-07-09 RX ORDER — INSULIN ASPART 100 [IU]/ML
INJECTION, SUSPENSION SUBCUTANEOUS
Qty: 5 PEN | Refills: 6 | Status: SHIPPED | OUTPATIENT
Start: 2021-07-09 | End: 2021-10-14 | Stop reason: SDUPTHER

## 2021-07-09 RX ORDER — HYDROCHLOROTHIAZIDE 25 MG/1
TABLET ORAL
COMMUNITY
End: 2021-07-09 | Stop reason: HOSPADM

## 2021-07-09 RX ORDER — METFORMIN HYDROCHLORIDE 500 MG/1
TABLET, EXTENDED RELEASE ORAL
Qty: 360 TABLET | Refills: 4 | Status: SHIPPED | OUTPATIENT
Start: 2021-07-09 | End: 2023-04-05

## 2021-07-09 NOTE — PATIENT INSTRUCTIONS
Change NovoLog Mix 70/30 insulin to 40 units before breakfast and 25 units before supper  Increase Metformin extended release 500 mg tablets to 2 tablets twice a day  Continue rest of the medications  Continue to work on her diet and activity  Always keep glucose source in case of low blood sugar  Annual eye exam and flu vaccine  Labs before follow-up.

## 2021-07-09 NOTE — PROGRESS NOTES
Endocrine Progress Note Outpatient     Patient Care Team:  Radha Meneses MD as PCP - General    Chief Complaint: Follow-up type 2 diabetes          HPI: 31-year-old female with history of type 2 diabetes, hypertension, hyperlipidemia and obesity is here for follow-up.  For type 2 diabetes: She is currently on Metformin extended release 5 mg twice a day along with NovoLog Mix 70/30 30 insulin, take 30 units before breakfast and 20 before supper.  She did bring in blood sugar records which were reviewed through her phone and pretty much all of them are about 250 and mostly about 300.  She is trying to work on her diet the best she can.  Hypertension: Well-controlled  Hyperlipidemia: Currently on atorvastatin.    Past Medical History:   Diagnosis Date   • Diabetes mellitus (CMS/HCC) 8/1/2018    Taking metformin and lantus   • Headache    • Hyperlipidemia    • Hypertension    • PCOS (polycystic ovarian syndrome)    • Sleep apnea    • Uterine cancer (CMS/HCC)    • Visual impairment        Social History     Socioeconomic History   • Marital status: Single     Spouse name: Not on file   • Number of children: Not on file   • Years of education: Not on file   • Highest education level: Not on file   Tobacco Use   • Smoking status: Never Smoker   • Smokeless tobacco: Never Used   Vaping Use   • Vaping Use: Never used   Substance and Sexual Activity   • Alcohol use: Never     Alcohol/week: 1.0 standard drinks     Types: 1 Glasses of wine per week     Comment: It’s probably once a month or less   • Drug use: No   • Sexual activity: Yes     Partners: Male     Birth control/protection: Post-menopausal, Surgical       Family History   Problem Relation Age of Onset   • Colon cancer Mother    • Uterine cancer Mother    • Cancer Mother         pancritic       Allergies   Allergen Reactions   • Lisinopril Cough   • Sulfa Antibiotics Hives   • Amoxicillin Rash   • Penicillins Rash       ROS:   Constitutional:  Denies  fatigue, tiredness.    Eyes:  Denies change in visual acuity   HENT:  Denies nasal congestion or sore throat   Respiratory: denies cough, shortness of breath.   Cardiovascular:  denies chest pain, edema   GI:  Denies abdominal pain, nausea, vomiting.   Musculoskeletal:  Denies back pain or joint pain   Integument:  Denies dry skin and rash   Neurologic:  Denies headache, focal weakness or sensory changes   Endocrine:  Denies polyuria or polydipsia   Psychiatric:  Denies depression or anxiety      Vitals:    07/09/21 1307   BP: 130/75   Pulse: 109   SpO2: 99%     Body mass index is 35.99 kg/m².     Physical Exam:  GEN: NAD, conversant, obese  EYES: EOMI, PERRL, no conjunctival erythema  NECK: no thyromegaly, full ROM   CV: RRR, no murmurs/rubs/gallops, no peripheral edema  LUNG: CTAB, no wheezes/rales/ronchi  SKIN: no rashes, no acanthosis  MSK: no deformities, full ROM of all extremities  NEURO: no tremors, DTR normal  PSYCH: AOX3, appropriate mood, affect normal      Results Review:     I reviewed the patient's new clinical results.    Lab Results   Component Value Date    HGBA1C 11.3 (H) 07/07/2021    HGBA1C 10.6 (H) 01/12/2021    HGBA1C 12.5 (H) 07/31/2019      Lab Results   Component Value Date    GLUCOSE 306 (H) 07/07/2021    BUN 10 07/07/2021    CREATININE 0.60 07/07/2021    EGFRIFNONA 117 07/07/2021    EGFRIFAFRI 141 07/07/2021    BCR 16.7 07/07/2021    K 4.4 07/07/2021    CO2 26.5 07/07/2021    CALCIUM 9.2 07/07/2021    ALBUMIN 4.60 07/07/2021    LABIL2 1.1 03/12/2019    AST 23 07/07/2021    ALT 29 07/07/2021    CHOL 186 07/07/2021    TRIG 219 (H) 07/07/2021     (H) 07/07/2021    HDL 46 07/07/2021     Lab Results   Component Value Date    TSH 1.480 07/31/2019         Medication Review: Reviewed.       Current Outpatient Medications:   •  ACCU-CHEK VALENTINE PLUS test strip, 2 (Two) Times a Day. for testing, Disp: , Rfl: 3  •  ACCU-CHEK SOFTCLIX LANCETS lancets, TEST BS BID, Disp: , Rfl: 3  •  amLODIPine  (NORVASC) 10 MG tablet, Take 1 tablet by mouth Daily., Disp: 90 tablet, Rfl: 0  •  atorvastatin (LIPITOR) 40 MG tablet, TAKE 1 TABLET BY MOUTH DAILY, Disp: 90 tablet, Rfl: 0  •  B-D UF III MINI PEN NEEDLES 31G X 5 MM misc, USE ONCE DAILY AS DIRECTED, Disp: 100 each, Rfl: 0  •  Blood Glucose Monitoring Suppl (ACCU-CHEK VALENTINE PLUS) w/Device kit, TEST BLOOD SUGAR BID, Disp: , Rfl: 0  •  Calcium 150 MG tablet, 600/400mg, Disp: , Rfl: 11  •  fluconazole (DIFLUCAN) 200 MG tablet, Take 200 mg by mouth Every 3 (Three) Days., Disp: , Rfl:   •  hydrOXYzine pamoate (Vistaril) 25 MG capsule, Take 1 capsule by mouth 3 (Three) Times a Day As Needed for Anxiety., Disp: 30 capsule, Rfl: 0  •  insulin aspart prot & aspart (NovoLOG Mix 70/30 FlexPen) (70-30) 100 UNIT/ML suspension pen-injector injection, Inject 30 units before breakfast and 15 before supper, Disp: 5 pen, Rfl: 6  •  losartan-hydrochlorothiazide (HYZAAR) 50-12.5 MG per tablet, Take 1 tablet by mouth Daily., Disp: , Rfl:   •  metFORMIN ER (GLUCOPHAGE-XR) 500 MG 24 hr tablet, 1 tablet twice a day., Disp: 180 tablet, Rfl: 2  •  sertraline (ZOLOFT) 50 MG tablet, TAKE 1 TABLET BY MOUTH DAILY, Disp: 30 tablet, Rfl: 0      Assessment/Plan   1.  Diabetes mellitus type 2: Uncontrolled, blood sugars are still very high at more than 250 most of the time.,  Will increase 70/30 insulin to 40 units before breakfast and 25 before supper and will increase Metformin to 2 tablets twice a day.  She is already working on her diet and activity but she will still pay more attention to it.  We will continue to follow blood sugars and A1c.  We did talk about Jardiance but at this time she cannot afford it.  2.  Hypertension: Well-controlled, continue current medications  3.  Hyperlipidemia: Her LDL and triglycerides are still high, we did thought about increasing the atorvastatin dose, she is currently taking 40 mg p.o. daily.  She will try to work on her diet and will follow lipid panel.  4.   Obesity: Advised to continue to work on diet and activity.            Eulalio Lopez MD FACE.

## 2021-07-19 NOTE — PROGRESS NOTES
Subjective   Yuko Lopez is a 31 y.o. female.     Patient is here today with concerns of possible ADHD.  Pt feels like she has trouble completing daily tasks, staying focused, and often times feels overwhelmed.  This has been going on since she was in highschool.  She is working on her bachelors degree for business and she feels like her symptoms are worsening.   She has never been on meds for it in the past.  She is on zoloft 50mg daily and feels like it is doing well for her.  She doesn't feel like her attention deficits is related to anxiety.  She would like to try something that is not a stimulant.  She has a friend that has done well with wellbutrin and she would like to try.    HTN- pt's BP is elevated in office today. She is currently on norvasc 10mg daily and losartan-Hctz 50-12.5 daily. Denies CP, SOA, dizziness. Occasional HA, but seems to be related to jaw clenching.        The following portions of the patient's history were reviewed and updated as appropriate: allergies, current medications, past family history, past medical history, past social history, past surgical history and problem list.    Review of Systems   Constitutional: Negative for chills, fatigue and fever.   Respiratory: Negative for chest tightness and shortness of breath.    Cardiovascular: Negative for chest pain and palpitations.   Neurological: Positive for headache. Negative for dizziness.   Psychiatric/Behavioral: Positive for decreased concentration and stress. Negative for self-injury, suicidal ideas and depressed mood. The patient is nervous/anxious.        Objective   BP (!) 167/122 (BP Location: Left arm, Patient Position: Sitting, Cuff Size: Large Adult)   Pulse 103   Temp 97.3 °F (36.3 °C) (Tympanic)   Wt 101 kg (223 lb)   SpO2 98%   BMI 35.99 kg/m²   Physical Exam  Constitutional:       Appearance: Normal appearance. She is not ill-appearing.   HENT:      Head: Normocephalic and atraumatic.   Cardiovascular:       Rate and Rhythm: Normal rate and regular rhythm.      Heart sounds: No murmur heard.     Pulmonary:      Effort: Pulmonary effort is normal. No respiratory distress.      Breath sounds: Normal breath sounds.   Neurological:      General: No focal deficit present.      Mental Status: She is alert and oriented to person, place, and time.   Psychiatric:         Mood and Affect: Mood normal.         Behavior: Behavior normal.         Thought Content: Thought content normal.         Judgment: Judgment normal.           Assessment/Plan     Diagnoses and all orders for this visit:    1. Hypertension, unspecified type (Primary)  Comments:  deteriorated  cont norvasc  inc losartan-hctz to 100-25  message BP readings in 1 wk  f/u 1 mo  Orders:  -     losartan-hydrochlorothiazide (HYZAAR) 50-12.5 MG per tablet; Take 2 tablets by mouth Daily.  Dispense: 60 tablet; Refill: 2    2. Attention or concentration deficit  Comments:  scored high on ADHD screening  new issues  BP too high for stimulant  start wellbutrin  f/u 1 mo  Orders:  -     buPROPion XL (Wellbutrin XL) 150 MG 24 hr tablet; Take 1 tablet by mouth Daily.  Dispense: 30 tablet; Refill: 0

## 2021-07-20 ENCOUNTER — OFFICE VISIT (OUTPATIENT)
Dept: FAMILY MEDICINE CLINIC | Facility: CLINIC | Age: 31
End: 2021-07-20

## 2021-07-20 VITALS
TEMPERATURE: 97.3 F | SYSTOLIC BLOOD PRESSURE: 167 MMHG | WEIGHT: 223 LBS | DIASTOLIC BLOOD PRESSURE: 122 MMHG | HEART RATE: 103 BPM | OXYGEN SATURATION: 98 % | BODY MASS INDEX: 35.99 KG/M2

## 2021-07-20 DIAGNOSIS — I10 HYPERTENSION, UNSPECIFIED TYPE: ICD-10-CM

## 2021-07-20 DIAGNOSIS — I10 HYPERTENSION, UNSPECIFIED TYPE: Primary | ICD-10-CM

## 2021-07-20 DIAGNOSIS — R41.840 ATTENTION OR CONCENTRATION DEFICIT: ICD-10-CM

## 2021-07-20 PROCEDURE — 99214 OFFICE O/P EST MOD 30 MIN: CPT | Performed by: NURSE PRACTITIONER

## 2021-07-20 RX ORDER — LOSARTAN POTASSIUM AND HYDROCHLOROTHIAZIDE 12.5; 5 MG/1; MG/1
2 TABLET ORAL DAILY
Qty: 180 TABLET | OUTPATIENT
Start: 2021-07-20

## 2021-07-20 RX ORDER — LOSARTAN POTASSIUM AND HYDROCHLOROTHIAZIDE 12.5; 5 MG/1; MG/1
2 TABLET ORAL DAILY
Qty: 60 TABLET | Refills: 2 | Status: SHIPPED | OUTPATIENT
Start: 2021-07-20 | End: 2021-08-20

## 2021-07-20 RX ORDER — BUPROPION HYDROCHLORIDE 150 MG/1
150 TABLET ORAL DAILY
Qty: 30 TABLET | Refills: 0 | Status: SHIPPED | OUTPATIENT
Start: 2021-07-20 | End: 2021-08-16

## 2021-07-20 NOTE — PATIENT INSTRUCTIONS
Increase losartan-Hctz  Check BP daily for 1 wk and message readings  Start wellbutrin daily  Call for issues or concerns

## 2021-08-16 DIAGNOSIS — R41.840 ATTENTION OR CONCENTRATION DEFICIT: ICD-10-CM

## 2021-08-16 RX ORDER — BUPROPION HYDROCHLORIDE 150 MG/1
150 TABLET ORAL DAILY
Qty: 30 TABLET | Refills: 0 | Status: SHIPPED | OUTPATIENT
Start: 2021-08-16 | End: 2021-08-20 | Stop reason: SDUPTHER

## 2021-08-20 ENCOUNTER — OFFICE VISIT (OUTPATIENT)
Dept: FAMILY MEDICINE CLINIC | Facility: CLINIC | Age: 31
End: 2021-08-20

## 2021-08-20 VITALS
DIASTOLIC BLOOD PRESSURE: 92 MMHG | BODY MASS INDEX: 36.48 KG/M2 | WEIGHT: 226 LBS | HEART RATE: 99 BPM | SYSTOLIC BLOOD PRESSURE: 137 MMHG | OXYGEN SATURATION: 98 % | TEMPERATURE: 97.7 F

## 2021-08-20 DIAGNOSIS — I10 HYPERTENSION, UNSPECIFIED TYPE: ICD-10-CM

## 2021-08-20 DIAGNOSIS — E78.5 HYPERLIPIDEMIA, UNSPECIFIED HYPERLIPIDEMIA TYPE: ICD-10-CM

## 2021-08-20 DIAGNOSIS — R41.840 ATTENTION OR CONCENTRATION DEFICIT: ICD-10-CM

## 2021-08-20 PROCEDURE — 99213 OFFICE O/P EST LOW 20 MIN: CPT | Performed by: NURSE PRACTITIONER

## 2021-08-20 RX ORDER — LOSARTAN POTASSIUM AND HYDROCHLOROTHIAZIDE 25; 100 MG/1; MG/1
1 TABLET ORAL DAILY
Qty: 90 TABLET | Refills: 1 | Status: SHIPPED | OUTPATIENT
Start: 2021-08-20 | End: 2023-04-05

## 2021-08-20 RX ORDER — BUPROPION HYDROCHLORIDE 150 MG/1
150 TABLET ORAL DAILY
Qty: 90 TABLET | Refills: 1 | Status: SHIPPED | OUTPATIENT
Start: 2021-08-20 | End: 2021-09-27

## 2021-08-20 RX ORDER — ATORVASTATIN CALCIUM 40 MG/1
40 TABLET, FILM COATED ORAL DAILY
Qty: 90 TABLET | Refills: 1 | Status: SHIPPED | OUTPATIENT
Start: 2021-08-20 | End: 2022-02-24

## 2021-08-20 NOTE — PROGRESS NOTES
Subjective     Yuko Lopez is a 31 y.o. female.     Pt is here today for a 1 mo follow up on HTN and ADHD.    HTN- Last month pts losartan-Hctz was increased to 100-25mg daily. She is also on norvasc 10mg daily. She states that she is doing well on the  Medications. She had some leg swelling one day but not since. Denies CP, SOA, dizziness, HA.    ADHD- this was a new dx last month. She was started on wellbutrin to see if it would help with concentration. BP was too high for a stimulant. Pt states that she is doing well on the medication. She states she has more energy and is less forgetful.  She started school this week and got some of her homework done early.  Denies any side effects.       The following portions of the patient's history were reviewed and updated as appropriate: allergies, current medications, past family history, past medical history, past social history, past surgical history and problem list.    Review of Systems   Constitutional: Negative for chills, fatigue and fever.   Respiratory: Negative for chest tightness and shortness of breath.    Cardiovascular: Negative for chest pain and palpitations.   Gastrointestinal: Positive for diarrhea and nausea. Negative for abdominal pain and vomiting.   Neurological: Negative for dizziness and headache.   Psychiatric/Behavioral: Negative for behavioral problems, depressed mood and stress. The patient is not nervous/anxious.        Objective     /92 (BP Location: Right arm, Patient Position: Sitting, Cuff Size: Large Adult)   Pulse 99   Temp 97.7 °F (36.5 °C) (Tympanic)   Wt 103 kg (226 lb)   SpO2 98%   BMI 36.48 kg/m²     Current Outpatient Medications on File Prior to Visit   Medication Sig Dispense Refill   • ACCU-CHEK VALENTINE PLUS test strip 2 (Two) Times a Day. for testing  3   • ACCU-CHEK SOFTCLIX LANCETS lancets TEST BS BID  3   • amLODIPine (NORVASC) 10 MG tablet Take 1 tablet by mouth Daily. 90 tablet 0   • B-D UF III MINI PEN  NEEDLES 31G X 5 MM misc USE ONCE DAILY AS DIRECTED 100 each 0   • Blood Glucose Monitoring Suppl (ACCU-CHEK VALENTINE PLUS) w/Device kit TEST BLOOD SUGAR BID  0   • Calcium 150 MG tablet 600/400mg  11   • fluconazole (DIFLUCAN) 200 MG tablet Take 200 mg by mouth Every 3 (Three) Days.     • hydrOXYzine pamoate (Vistaril) 25 MG capsule Take 1 capsule by mouth 3 (Three) Times a Day As Needed for Anxiety. 30 capsule 0   • insulin aspart prot & aspart (NovoLOG Mix 70/30 FlexPen) (70-30) 100 UNIT/ML suspension pen-injector injection Inject 40 units before breakfast and 25 before supper 5 pen 6   • metFORMIN ER (GLUCOPHAGE-XR) 500 MG 24 hr tablet 2 tablets twice a day. 360 tablet 4   • sertraline (ZOLOFT) 50 MG tablet TAKE 1 TABLET BY MOUTH DAILY 30 tablet 0   • [DISCONTINUED] atorvastatin (LIPITOR) 40 MG tablet TAKE 1 TABLET BY MOUTH DAILY 90 tablet 0   • [DISCONTINUED] buPROPion XL (WELLBUTRIN XL) 150 MG 24 hr tablet TAKE 1 TABLET BY MOUTH DAILY 30 tablet 0   • [DISCONTINUED] losartan-hydrochlorothiazide (HYZAAR) 50-12.5 MG per tablet Take 2 tablets by mouth Daily. 60 tablet 2     No current facility-administered medications on file prior to visit.        Physical Exam  Constitutional:       Appearance: Normal appearance. She is obese. She is not ill-appearing.   HENT:      Head: Normocephalic and atraumatic.   Cardiovascular:      Rate and Rhythm: Normal rate and regular rhythm.      Heart sounds: No murmur heard.     Pulmonary:      Effort: Pulmonary effort is normal. No respiratory distress.      Breath sounds: Normal breath sounds.   Musculoskeletal:         General: Normal range of motion.   Skin:     General: Skin is warm and dry.   Neurological:      General: No focal deficit present.      Mental Status: She is alert and oriented to person, place, and time.   Psychiatric:         Mood and Affect: Mood normal.         Behavior: Behavior normal.         Thought Content: Thought content normal.         Judgment: Judgment  normal.           Assessment/Plan     Diagnoses and all orders for this visit:    1. Hypertension, unspecified type  Comments:  improved  cont losartan-hctz to 100-25  cot norvasc 10mg daily  f/u 6 mo    2. Attention or concentration deficit  Comments:  improved  cont wellbutrin  denies side effects  Orders:  -     buPROPion XL (WELLBUTRIN XL) 150 MG 24 hr tablet; Take 1 tablet by mouth Daily.  Dispense: 90 tablet; Refill: 1    3. Hyperlipidemia, unspecified hyperlipidemia type  -     atorvastatin (LIPITOR) 40 MG tablet; Take 1 tablet by mouth Daily.  Dispense: 90 tablet; Refill: 1    Other orders  -     losartan-hydrochlorothiazide (Hyzaar) 100-25 MG per tablet; Take 1 tablet by mouth Daily.  Dispense: 90 tablet; Refill: 1

## 2021-08-22 RX ORDER — NITROFURANTOIN 25; 75 MG/1; MG/1
100 CAPSULE ORAL 2 TIMES DAILY
Qty: 10 CAPSULE | Refills: 0 | Status: SHIPPED | OUTPATIENT
Start: 2021-08-22 | End: 2021-08-27

## 2021-08-24 DIAGNOSIS — F41.9 ANXIETY: ICD-10-CM

## 2021-08-25 ENCOUNTER — OFFICE VISIT (OUTPATIENT)
Dept: FAMILY MEDICINE CLINIC | Facility: CLINIC | Age: 31
End: 2021-08-25

## 2021-08-25 ENCOUNTER — HOSPITAL ENCOUNTER (EMERGENCY)
Facility: HOSPITAL | Age: 31
Discharge: HOME | End: 2021-08-25
Admitting: EMERGENCY MEDICINE

## 2021-08-25 ENCOUNTER — TELEPHONE (OUTPATIENT)
Dept: FAMILY MEDICINE CLINIC | Facility: CLINIC | Age: 31
End: 2021-08-25

## 2021-08-25 ENCOUNTER — HOSPITAL ENCOUNTER (OUTPATIENT)
Dept: CT IMAGING | Facility: HOSPITAL | Age: 31
Discharge: HOME | End: 2021-08-25

## 2021-08-25 ENCOUNTER — LAB (OUTPATIENT)
Dept: LAB | Facility: HOSPITAL | Age: 31
End: 2021-08-25

## 2021-08-25 VITALS
DIASTOLIC BLOOD PRESSURE: 86 MMHG | TEMPERATURE: 98.2 F | OXYGEN SATURATION: 99 % | SYSTOLIC BLOOD PRESSURE: 158 MMHG | BODY MASS INDEX: 37.47 KG/M2 | HEART RATE: 97 BPM | WEIGHT: 224.87 LBS | RESPIRATION RATE: 16 BRPM | HEIGHT: 65 IN

## 2021-08-25 VITALS
SYSTOLIC BLOOD PRESSURE: 142 MMHG | BODY MASS INDEX: 36.32 KG/M2 | WEIGHT: 225 LBS | OXYGEN SATURATION: 99 % | DIASTOLIC BLOOD PRESSURE: 96 MMHG | TEMPERATURE: 96.9 F | HEART RATE: 118 BPM

## 2021-08-25 DIAGNOSIS — R10.30 LOWER ABDOMINAL PAIN: Primary | ICD-10-CM

## 2021-08-25 DIAGNOSIS — R10.30 LOWER ABDOMINAL PAIN: ICD-10-CM

## 2021-08-25 DIAGNOSIS — K63.89 EPIPLOIC APPENDAGITIS: ICD-10-CM

## 2021-08-25 LAB
ALBUMIN SERPL-MCNC: 5 G/DL (ref 3.5–5.2)
ALBUMIN/GLOB SERPL: 1.4 G/DL
ALP SERPL-CCNC: 84 U/L (ref 39–117)
ALT SERPL W P-5'-P-CCNC: 39 U/L (ref 1–33)
ANION GAP SERPL CALCULATED.3IONS-SCNC: 12.5 MMOL/L (ref 5–15)
AST SERPL-CCNC: 29 U/L (ref 1–32)
BASOPHILS # BLD AUTO: 0.08 10*3/MM3 (ref 0–0.2)
BASOPHILS NFR BLD AUTO: 0.8 % (ref 0–1.5)
BILIRUB BLD-MCNC: NEGATIVE MG/DL
BILIRUB SERPL-MCNC: 0.6 MG/DL (ref 0–1.2)
BUN SERPL-MCNC: 9 MG/DL (ref 6–20)
BUN/CREAT SERPL: 16.1 (ref 7–25)
CALCIUM SPEC-SCNC: 10.4 MG/DL (ref 8.6–10.5)
CHLORIDE SERPL-SCNC: 98 MMOL/L (ref 98–107)
CLARITY, POC: ABNORMAL
CO2 SERPL-SCNC: 26.5 MMOL/L (ref 22–29)
COLOR UR: YELLOW
CREAT BLDA-MCNC: 0.5 MG/DL (ref 0.6–1.3)
CREAT SERPL-MCNC: 0.56 MG/DL (ref 0.57–1)
DEPRECATED RDW RBC AUTO: 41.9 FL (ref 37–54)
EOSINOPHIL # BLD AUTO: 0.24 10*3/MM3 (ref 0–0.4)
EOSINOPHIL NFR BLD AUTO: 2.4 % (ref 0.3–6.2)
ERYTHROCYTE [DISTWIDTH] IN BLOOD BY AUTOMATED COUNT: 13.7 % (ref 12.3–15.4)
GFR SERPL CREATININE-BSD FRML MDRD: 126 ML/MIN/1.73
GFR SERPL CREATININE-BSD FRML MDRD: >150 ML/MIN/1.73
GLOBULIN UR ELPH-MCNC: 3.7 GM/DL
GLUCOSE SERPL-MCNC: 164 MG/DL (ref 65–99)
GLUCOSE UR STRIP-MCNC: ABNORMAL MG/DL
HCT VFR BLD AUTO: 40.9 % (ref 34–46.6)
HGB BLD-MCNC: 14 G/DL (ref 12–15.9)
IMM GRANULOCYTES # BLD AUTO: 0.04 10*3/MM3 (ref 0–0.05)
IMM GRANULOCYTES NFR BLD AUTO: 0.4 % (ref 0–0.5)
KETONES UR QL: ABNORMAL
LEUKOCYTE EST, POC: NEGATIVE
LYMPHOCYTES # BLD AUTO: 3.44 10*3/MM3 (ref 0.7–3.1)
LYMPHOCYTES NFR BLD AUTO: 34.2 % (ref 19.6–45.3)
MCH RBC QN AUTO: 28.9 PG (ref 26.6–33)
MCHC RBC AUTO-ENTMCNC: 34.2 G/DL (ref 31.5–35.7)
MCV RBC AUTO: 84.5 FL (ref 79–97)
MONOCYTES # BLD AUTO: 0.67 10*3/MM3 (ref 0.1–0.9)
MONOCYTES NFR BLD AUTO: 6.7 % (ref 5–12)
NEUTROPHILS NFR BLD AUTO: 5.58 10*3/MM3 (ref 1.7–7)
NEUTROPHILS NFR BLD AUTO: 55.5 % (ref 42.7–76)
NITRITE UR-MCNC: NEGATIVE MG/ML
NRBC BLD AUTO-RTO: 0 /100 WBC (ref 0–0.2)
PH UR: 5 [PH] (ref 5–8)
PLATELET # BLD AUTO: 415 10*3/MM3 (ref 140–450)
PMV BLD AUTO: 10 FL (ref 6–12)
POTASSIUM SERPL-SCNC: 4 MMOL/L (ref 3.5–5.2)
PROT SERPL-MCNC: 8.7 G/DL (ref 6–8.5)
PROT UR STRIP-MCNC: ABNORMAL MG/DL
RBC # BLD AUTO: 4.84 10*6/MM3 (ref 3.77–5.28)
RBC # UR STRIP: NEGATIVE /UL
SODIUM SERPL-SCNC: 137 MMOL/L (ref 136–145)
SP GR UR: 1.03 (ref 1–1.03)
UROBILINOGEN UR QL: NORMAL
WBC # BLD AUTO: 10.05 10*3/MM3 (ref 3.4–10.8)

## 2021-08-25 PROCEDURE — 25010000002 MORPHINE PER 10 MG: Performed by: NURSE PRACTITIONER

## 2021-08-25 PROCEDURE — 74177 CT ABD & PELVIS W/CONTRAST: CPT

## 2021-08-25 PROCEDURE — 25010000002 ONDANSETRON PER 1 MG: Performed by: NURSE PRACTITIONER

## 2021-08-25 PROCEDURE — 0 IOPAMIDOL PER 1 ML: Performed by: NURSE PRACTITIONER

## 2021-08-25 PROCEDURE — 96374 THER/PROPH/DIAG INJ IV PUSH: CPT

## 2021-08-25 PROCEDURE — 99282 EMERGENCY DEPT VISIT SF MDM: CPT

## 2021-08-25 PROCEDURE — 36415 COLL VENOUS BLD VENIPUNCTURE: CPT

## 2021-08-25 PROCEDURE — 99214 OFFICE O/P EST MOD 30 MIN: CPT | Performed by: NURSE PRACTITIONER

## 2021-08-25 PROCEDURE — 81003 URINALYSIS AUTO W/O SCOPE: CPT | Performed by: NURSE PRACTITIONER

## 2021-08-25 PROCEDURE — 82565 ASSAY OF CREATININE: CPT

## 2021-08-25 PROCEDURE — 96375 TX/PRO/DX INJ NEW DRUG ADDON: CPT

## 2021-08-25 PROCEDURE — 85025 COMPLETE CBC W/AUTO DIFF WBC: CPT

## 2021-08-25 PROCEDURE — 25010000002 KETOROLAC TROMETHAMINE PER 15 MG: Performed by: NURSE PRACTITIONER

## 2021-08-25 PROCEDURE — 80053 COMPREHEN METABOLIC PANEL: CPT

## 2021-08-25 RX ORDER — SODIUM CHLORIDE 0.9 % (FLUSH) 0.9 %
10 SYRINGE (ML) INJECTION AS NEEDED
Status: DISCONTINUED | OUTPATIENT
Start: 2021-08-25 | End: 2021-08-26 | Stop reason: HOSPADM

## 2021-08-25 RX ORDER — SODIUM CHLORIDE 0.9 % (FLUSH) 0.9 %
10 SYRINGE (ML) INJECTION AS NEEDED
Status: CANCELLED | OUTPATIENT
Start: 2021-08-25

## 2021-08-25 RX ORDER — KETOROLAC TROMETHAMINE 15 MG/ML
15 INJECTION, SOLUTION INTRAMUSCULAR; INTRAVENOUS ONCE
Status: COMPLETED | OUTPATIENT
Start: 2021-08-25 | End: 2021-08-25

## 2021-08-25 RX ORDER — HYDROXYZINE PAMOATE 25 MG/1
CAPSULE ORAL
Qty: 30 CAPSULE | Refills: 0 | Status: SHIPPED | OUTPATIENT
Start: 2021-08-25 | End: 2022-03-02

## 2021-08-25 RX ORDER — ONDANSETRON 2 MG/ML
4 INJECTION INTRAMUSCULAR; INTRAVENOUS ONCE
Status: COMPLETED | OUTPATIENT
Start: 2021-08-25 | End: 2021-08-25

## 2021-08-25 RX ORDER — MORPHINE SULFATE 4 MG/ML
4 INJECTION, SOLUTION INTRAMUSCULAR; INTRAVENOUS ONCE
Status: DISCONTINUED | OUTPATIENT
Start: 2021-08-25 | End: 2021-08-25

## 2021-08-25 RX ORDER — TRAMADOL HYDROCHLORIDE 50 MG/1
50 TABLET ORAL EVERY 6 HOURS PRN
Qty: 11 TABLET | Refills: 0 | Status: SHIPPED | OUTPATIENT
Start: 2021-08-25 | End: 2022-07-29

## 2021-08-25 RX ADMIN — ONDANSETRON 4 MG: 2 INJECTION INTRAMUSCULAR; INTRAVENOUS at 23:26

## 2021-08-25 RX ADMIN — IOPAMIDOL 100 ML: 755 INJECTION, SOLUTION INTRAVENOUS at 18:02

## 2021-08-25 RX ADMIN — KETOROLAC TROMETHAMINE 15 MG: 15 INJECTION, SOLUTION INTRAMUSCULAR; INTRAVENOUS at 23:42

## 2021-08-25 NOTE — TELEPHONE ENCOUNTER
I sent her a message. She said they told her there was no appts. I have a 330 (I have it blocked for her). See if she would like that appt

## 2021-08-25 NOTE — TELEPHONE ENCOUNTER
Caller: Yuko Lopez    Relationship: Self    Best call back number: [phone]    Who are you requesting to speak with (clinical staff, provider,  specific staff member): MARIELA MURPHY OR MA     What was the call regarding: BLADDER INFECTION ADVICE    Do you require a callback: YES

## 2021-08-25 NOTE — TELEPHONE ENCOUNTER
Patient notified and patient is having lower abdomen pain all the time now in general. Been taking antibiotic since Sunday and the pain with urination and odor has gone away but still just having pain and when she bends over.

## 2021-08-25 NOTE — PROGRESS NOTES
Subjective     Yuko Lopez is a 31 y.o. female.     Pt is here today with c/o lower abdominal pain for the last 4 days.  She was recently treated for a UTI with macrobid.  Symptoms started with pain in lower abdomen and an odor in urine.  Since taking the macrobid the odor has improved but still having pelvic discomfort.  Denies any new sexual partners- was tested 2 months ago for STDs.  She has regular bowel movements daily.  Pt has had a hysterectomy in 2019 d/t endometrial and uterine cancer.  She sees Dr. Andrew.  Has occasional nausea, but no vomiting.   Pain is in the lower mid abdomen.  It is a throbbing sensation with deep palpation.  Bending over worsens the pain.  She has to change positions to get comfortable.   Pt still has her appendix  Denies any fever  Pt is diabetic.   She sees endocrinology  BS has been averaging in the 200s. Today it was 301.  Her UA shows >100 glucose in urine.   She has been working closely with endo on adjusting meds.       The following portions of the patient's history were reviewed and updated as appropriate: allergies, current medications, past family history, past medical history, past social history, past surgical history and problem list.    Review of Systems   Constitutional: Negative for chills, fatigue and fever.   Respiratory: Negative for chest tightness and shortness of breath.    Cardiovascular: Negative for chest pain and palpitations.   Gastrointestinal: Positive for abdominal pain and nausea. Negative for constipation, diarrhea and vomiting.   Neurological: Negative for dizziness and headache.       Objective     /96 (BP Location: Right arm, Patient Position: Sitting, Cuff Size: Large Adult)   Pulse 118   Temp 96.9 °F (36.1 °C) (Tympanic)   Wt 102 kg (225 lb)   SpO2 99%   BMI 36.32 kg/m²     Current Outpatient Medications on File Prior to Visit   Medication Sig Dispense Refill   • ACCU-CHEK VALENTINE PLUS test strip 2 (Two) Times a Day. for testing   3   • ACCU-CHEK SOFTCLIX LANCETS lancets TEST BS BID  3   • amLODIPine (NORVASC) 10 MG tablet Take 1 tablet by mouth Daily. 90 tablet 0   • atorvastatin (LIPITOR) 40 MG tablet Take 1 tablet by mouth Daily. 90 tablet 1   • B-D UF III MINI PEN NEEDLES 31G X 5 MM misc USE ONCE DAILY AS DIRECTED 100 each 0   • Blood Glucose Monitoring Suppl (ACCU-CHEK VALENTINE PLUS) w/Device kit TEST BLOOD SUGAR BID  0   • buPROPion XL (WELLBUTRIN XL) 150 MG 24 hr tablet Take 1 tablet by mouth Daily. 90 tablet 1   • Calcium 150 MG tablet 600/400mg  11   • fluconazole (DIFLUCAN) 200 MG tablet Take 200 mg by mouth Every 3 (Three) Days.     • hydrOXYzine pamoate (VISTARIL) 25 MG capsule TAKE 1 CAPSULE BY MOUTH THREE TIMES DAILY AS NEEDED FOR ANXIETY 30 capsule 0   • insulin aspart prot & aspart (NovoLOG Mix 70/30 FlexPen) (70-30) 100 UNIT/ML suspension pen-injector injection Inject 40 units before breakfast and 25 before supper 5 pen 6   • losartan-hydrochlorothiazide (Hyzaar) 100-25 MG per tablet Take 1 tablet by mouth Daily. 90 tablet 1   • metFORMIN ER (GLUCOPHAGE-XR) 500 MG 24 hr tablet 2 tablets twice a day. 360 tablet 4   • nitrofurantoin, macrocrystal-monohydrate, (Macrobid) 100 MG capsule Take 1 capsule by mouth 2 (Two) Times a Day for 5 days. 10 capsule 0   • sertraline (ZOLOFT) 50 MG tablet TAKE 1 TABLET BY MOUTH DAILY 30 tablet 0   • [DISCONTINUED] hydrOXYzine pamoate (Vistaril) 25 MG capsule Take 1 capsule by mouth 3 (Three) Times a Day As Needed for Anxiety. 30 capsule 0     No current facility-administered medications on file prior to visit.        Physical Exam  Constitutional:       Appearance: Normal appearance. She is obese. She is not ill-appearing.   HENT:      Head: Normocephalic and atraumatic.   Cardiovascular:      Rate and Rhythm: Normal rate and regular rhythm.      Heart sounds: No murmur heard.     Pulmonary:      Effort: Pulmonary effort is normal. No respiratory distress.      Breath sounds: Normal breath  sounds.   Abdominal:      Tenderness: There is abdominal tenderness (lower pelvic region midline- pt tearful on exam).   Neurological:      General: No focal deficit present.      Mental Status: She is alert and oriented to person, place, and time.   Psychiatric:         Mood and Affect: Mood normal.         Behavior: Behavior normal.         Thought Content: Thought content normal.         Judgment: Judgment normal.           Assessment/Plan     Diagnoses and all orders for this visit:    1. Lower abdominal pain (Primary)  Comments:  unknown etiology  UA does not indicate UTI  pain on exam- history of GYN cancer  check labs  get stat CT due to symptoms and Hx  Orders:  -     POCT urinalysis dipstick, automated  -     Comprehensive metabolic panel; Future  -     CBC w AUTO Differential; Future  -     CT Abdomen Pelvis With Contrast; Future

## 2021-08-26 ENCOUNTER — LAB (OUTPATIENT)
Dept: LAB | Facility: HOSPITAL | Age: 31
End: 2021-08-26

## 2021-08-26 ENCOUNTER — TELEPHONE (OUTPATIENT)
Dept: FAMILY MEDICINE CLINIC | Facility: CLINIC | Age: 31
End: 2021-08-26

## 2021-08-26 DIAGNOSIS — R10.30 LOWER ABDOMINAL PAIN: ICD-10-CM

## 2021-08-26 DIAGNOSIS — R10.30 LOWER ABDOMINAL PAIN: Primary | ICD-10-CM

## 2021-08-26 LAB — D-LACTATE SERPL-SCNC: 1.7 MMOL/L (ref 0.5–2)

## 2021-08-26 PROCEDURE — 83605 ASSAY OF LACTIC ACID: CPT

## 2021-08-26 NOTE — DISCHARGE INSTRUCTIONS
Take pain medication as prescribed.  Take Zofran as needed for nausea.  If began running fever have persistent vomiting or increased abdominal pain uncontrolled with pain medication return to ER immediately otherwise follow-up primary care provider

## 2021-08-26 NOTE — TELEPHONE ENCOUNTER
I called and discussed CT results and labs and ER visit with pt. Advised her I would like to check a lactic acid. I am also going to refer her to GI. She is to follow up with me if symptoms worsen or do not improve

## 2021-08-26 NOTE — ED PROVIDER NOTES
Subjective   History: Patient is a 31-year-old female complains of lower abdominal pain that began over the weekend.  Reports pain is fairly constant, dull, intensifies at times.  Sometimes worse when bending over.  Has had nausea no vomiting.  Denies fever chills.  Reports she contacted her PCP over the weekend was placed on Macrobid prophylactically for UTI.  Had UA completed today was negative for infection.  States she also had blood work completed today and was sent for outpatient CT abdomen pelvis with contrast.  After scan completed scan was abnormal and therefore sent to ER.      Onset: 4 to 5 days  Location: Lower abdomen  Duration: Constant  Character: Dull  Aggravating/Alleviating factors: Bending over sometimes makes pain worse  Radiation nonradiating  Severity: Mild to moderate            Review of Systems   Constitutional: Negative for chills, fatigue and fever.   HENT: Negative for congestion, sore throat, tinnitus and trouble swallowing.    Eyes: Negative for photophobia, discharge and visual disturbance.   Respiratory: Negative for cough and shortness of breath.    Cardiovascular: Negative for chest pain.   Gastrointestinal: Positive for abdominal pain and nausea. Negative for diarrhea and vomiting.   Genitourinary: Negative for dysuria, frequency and urgency.   Musculoskeletal: Negative for back pain and myalgias.   Skin: Negative for rash.   Neurological: Negative for dizziness and headaches.   Psychiatric/Behavioral: Negative for confusion.       Past Medical History:   Diagnosis Date   • Diabetes mellitus (CMS/HCC) 8/1/2018    Taking metformin and lantus   • Headache    • Hyperlipidemia    • Hypertension    • PCOS (polycystic ovarian syndrome)    • Sleep apnea    • Uterine cancer (CMS/HCC)    • Visual impairment        Allergies   Allergen Reactions   • Lisinopril Cough   • Sulfa Antibiotics Hives   • Amoxicillin Rash   • Penicillins Rash       Past Surgical History:   Procedure Laterality Date    • HYSTERECTOMY         Family History   Problem Relation Age of Onset   • Colon cancer Mother    • Uterine cancer Mother    • Cancer Mother         pancritic       Social History     Socioeconomic History   • Marital status: Single     Spouse name: Not on file   • Number of children: Not on file   • Years of education: Not on file   • Highest education level: Not on file   Tobacco Use   • Smoking status: Never Smoker   • Smokeless tobacco: Never Used   Vaping Use   • Vaping Use: Never used   Substance and Sexual Activity   • Alcohol use: Never     Alcohol/week: 1.0 standard drinks     Types: 1 Glasses of wine per week     Comment: It’s probably once a month or less   • Drug use: No   • Sexual activity: Yes     Partners: Male     Birth control/protection: Post-menopausal, Surgical           Objective   Physical Exam  Vitals reviewed.   Constitutional:       General: She is not in acute distress.     Appearance: She is obese. She is not toxic-appearing.   HENT:      Head: Normocephalic.   Eyes:      Pupils: Pupils are equal, round, and reactive to light.   Cardiovascular:      Rate and Rhythm: Tachycardia present.      Heart sounds: Normal heart sounds. No murmur heard.     Pulmonary:      Effort: Pulmonary effort is normal.      Breath sounds: Normal breath sounds.   Abdominal:      General: Bowel sounds are normal. There is no distension.      Palpations: Abdomen is soft.      Tenderness: There is abdominal tenderness in the right lower quadrant and suprapubic area. There is no rebound.      Comments: Nonrigid.   Skin:     General: Skin is warm and dry.      Findings: No rash.   Neurological:      Mental Status: She is alert and oriented to person, place, and time.   Psychiatric:         Mood and Affect: Mood normal.         Behavior: Behavior normal.         Procedures           ED Course  ED Course as of Aug 25 2334   Wed Aug 25, 2021   2245 Spoke with dr miller, on-call for Hilda Acosta NP updated on  "patient complaint as well as physical exam and CT findings.  Will treat for pain and nausea and okay with patient being discharged home and close follow-up in the office.    [KJ]      ED Course User Index  [KJ] Elizabet Wills, APRN      BP (!) 156/104 (BP Location: Left arm, Patient Position: Sitting)   Pulse 118   Temp 97.7 °F (36.5 °C) (Oral)   Resp 20   Ht 165.1 cm (65\")   Wt 102 kg (224 lb 13.9 oz)   SpO2 99%   BMI 37.42 kg/m²   Labs Reviewed - No data to display  Medications   sodium chloride 0.9 % flush 10 mL (has no administration in time range)   ketorolac (TORADOL) injection 15 mg (has no administration in time range)   ondansetron (ZOFRAN) injection 4 mg (4 mg Intravenous Given 8/25/21 2326)     CT Abdomen Pelvis With Contrast    Result Date: 8/25/2021  1. There is abnormal fat stranding in the inferior left pelvis around a fat density area. The appearance is suggestive of epiploic appendagitis or omental infarct. Uncomplicated diverticulitis is not considered likely. 2.  Suspect hepatic steatosis. 3. Additional findings as reported above.  Electronically Signed By-Marry Moy MD On:8/25/2021 6:22 PM This report was finalized on [id number] by  Marry Moy MD.                                         MDM     I examined the patient using the appropriate personal protective equipment.      DISPOSITION:   Chart Review: Lab work from today 8/25/2021 revealed white count of 10.05 H&H 14 and 40.9 platelets 415 glucose 164    Comorbidity:  has a past medical history of Diabetes mellitus (CMS/HCC) (8/1/2018), Headache, Hyperlipidemia, Hypertension, PCOS (polycystic ovarian syndrome), Sleep apnea, Uterine cancer (CMS/HCC), and Visual impairment.  Differentials:this list is not all inclusive and does not constitute the entirety of considered causes --> UTI appendicitis viral syndrome  ECG: interpreted by ER physician and reviewed by myself: Not applicable  Labs: Blood work drawn prior to arrival " today-Lab work from today 8/25/2021 revealed white count of 10.05 H&H 14 and 40.9 platelets 415 glucose 164.  Urinalysis negative for blood nitrites and leukocytes    Imaging: Was interpreted by physician and reviewed by myself:  CT Abdomen Pelvis With Contrast    Result Date: 8/25/2021  1. There is abnormal fat stranding in the inferior left pelvis around a fat density area. The appearance is suggestive of epiploic appendagitis or omental infarct. Uncomplicated diverticulitis is not considered likely. 2.  Suspect hepatic steatosis. 3. Additional findings as reported above.  Electronically Signed By-Marry Moy MD On:8/25/2021 6:22 PM This report was finalized on [id number] by  Marry Moy MD.      Disposition/Treatment:    Patient does not appear ill she is not febrile on arrival to ER she is slightly tachycardic could be due to pain.  Physical exam mildly tender to lower suprapubic and right lower quadrant region however patient was even deep palpating herself without excessive discomfort in an effort to show me where she was uncomfortable.  Patient has some nausea but denies any at this time no vomiting while in ER.  Given morphine for discomfort.  Patient had CT abdomen pelvis prior to arrival showing abnormal fat stranding in inferior left pelvis around a fat density area.  The appearance is suggestive of epiploic appendagitis or omental infarct.  Uncomplicated diverticulitis not considered likely.  Suspect hepatic steatosis.  Patient's abdomen is soft nonrigid, no evidence of peritonitis, nonsurgical.  I did call on-call physician for Hilda Acosta NP patient's PCP, I spoke with Dr. Godoy updated her patient complaint CT results and current status.  Will manage patient pain with toradol while in ER and  nausea and discharge home will follow up with PCP in the office.  Discussed emergent reasons with patient to return to ER otherwise outpatient follow-up is appropriate.  Patient stable for  discharge.    Inspect ordered    Prescription: Tramadol    Final diagnoses:   Lower abdominal pain   Epiploic appendagitis       ED Disposition  ED Disposition     ED Disposition Condition Comment    Discharge Stable           No follow-up provider specified.       Medication List      New Prescriptions    traMADol 50 MG tablet  Commonly known as: ULTRAM  Take 1 tablet by mouth Every 6 (Six) Hours As Needed for Moderate Pain .           Where to Get Your Medications      These medications were sent to [id number] - [facility] [place] - [address] AT [address] [facility], [place] [phone] [facility] [phone]   [address] [facility] [place] [address]    Phone: [phone]   · traMADol 50 MG tablet          Elizabet Wills, APRN  08/25/21 6850

## 2021-08-27 ENCOUNTER — OFFICE (AMBULATORY)
Dept: URBAN - METROPOLITAN AREA CLINIC 64 | Facility: CLINIC | Age: 31
End: 2021-08-27

## 2021-08-27 VITALS
HEIGHT: 65 IN | HEART RATE: 96 BPM | DIASTOLIC BLOOD PRESSURE: 93 MMHG | SYSTOLIC BLOOD PRESSURE: 137 MMHG | WEIGHT: 226 LBS

## 2021-08-27 DIAGNOSIS — R10.2 PELVIC AND PERINEAL PAIN: ICD-10-CM

## 2021-08-27 DIAGNOSIS — K63.89 OTHER SPECIFIED DISEASES OF INTESTINE: ICD-10-CM

## 2021-08-27 DIAGNOSIS — R12 HEARTBURN: ICD-10-CM

## 2021-08-27 DIAGNOSIS — R19.4 CHANGE IN BOWEL HABIT: ICD-10-CM

## 2021-08-27 PROCEDURE — 99244 OFF/OP CNSLTJ NEW/EST MOD 40: CPT | Performed by: INTERNAL MEDICINE

## 2021-08-27 RX ORDER — LEVOFLOXACIN 500 MG/1
500 TABLET, FILM COATED ORAL
Qty: 7 | Refills: 0 | Status: COMPLETED
Start: 2021-08-27 | End: 2022-04-11

## 2021-08-30 ENCOUNTER — OFFICE (AMBULATORY)
Dept: URBAN - METROPOLITAN AREA PATHOLOGY 4 | Facility: PATHOLOGY | Age: 31
End: 2021-08-30

## 2021-08-30 ENCOUNTER — ON CAMPUS - OUTPATIENT (AMBULATORY)
Dept: URBAN - METROPOLITAN AREA HOSPITAL 2 | Facility: HOSPITAL | Age: 31
End: 2021-08-30

## 2021-08-30 VITALS
RESPIRATION RATE: 14 BRPM | RESPIRATION RATE: 16 BRPM | HEART RATE: 119 BPM | DIASTOLIC BLOOD PRESSURE: 64 MMHG | SYSTOLIC BLOOD PRESSURE: 128 MMHG | DIASTOLIC BLOOD PRESSURE: 87 MMHG | HEART RATE: 101 BPM | HEART RATE: 134 BPM | SYSTOLIC BLOOD PRESSURE: 139 MMHG | HEIGHT: 65 IN | DIASTOLIC BLOOD PRESSURE: 84 MMHG | SYSTOLIC BLOOD PRESSURE: 104 MMHG | OXYGEN SATURATION: 94 % | OXYGEN SATURATION: 96 % | DIASTOLIC BLOOD PRESSURE: 58 MMHG | SYSTOLIC BLOOD PRESSURE: 114 MMHG | DIASTOLIC BLOOD PRESSURE: 63 MMHG | TEMPERATURE: 96.2 F | OXYGEN SATURATION: 98 % | SYSTOLIC BLOOD PRESSURE: 113 MMHG | WEIGHT: 217 LBS | HEART RATE: 108 BPM | HEART RATE: 112 BPM | HEART RATE: 114 BPM | SYSTOLIC BLOOD PRESSURE: 121 MMHG | HEART RATE: 111 BPM | RESPIRATION RATE: 18 BRPM | OXYGEN SATURATION: 100 % | DIASTOLIC BLOOD PRESSURE: 71 MMHG | RESPIRATION RATE: 15 BRPM | OXYGEN SATURATION: 95 % | OXYGEN SATURATION: 97 % | DIASTOLIC BLOOD PRESSURE: 85 MMHG | SYSTOLIC BLOOD PRESSURE: 134 MMHG | SYSTOLIC BLOOD PRESSURE: 124 MMHG

## 2021-08-30 DIAGNOSIS — R19.4 CHANGE IN BOWEL HABIT: ICD-10-CM

## 2021-08-30 DIAGNOSIS — R10.2 PELVIC AND PERINEAL PAIN: ICD-10-CM

## 2021-08-30 DIAGNOSIS — K63.89 OTHER SPECIFIED DISEASES OF INTESTINE: ICD-10-CM

## 2021-08-30 DIAGNOSIS — D12.2 BENIGN NEOPLASM OF ASCENDING COLON: ICD-10-CM

## 2021-08-30 DIAGNOSIS — K63.5 POLYP OF COLON: ICD-10-CM

## 2021-08-30 LAB
GI HISTOLOGY: A. UNSPECIFIED: (no result)
GI HISTOLOGY: B. UNSPECIFIED: (no result)
GI HISTOLOGY: PDF REPORT: (no result)

## 2021-08-30 PROCEDURE — 45380 COLONOSCOPY AND BIOPSY: CPT | Mod: 59 | Performed by: INTERNAL MEDICINE

## 2021-08-30 PROCEDURE — 88305 TISSUE EXAM BY PATHOLOGIST: CPT | Performed by: INTERNAL MEDICINE

## 2021-08-30 PROCEDURE — 45385 COLONOSCOPY W/LESION REMOVAL: CPT | Performed by: INTERNAL MEDICINE

## 2021-09-27 DIAGNOSIS — R41.840 ATTENTION OR CONCENTRATION DEFICIT: ICD-10-CM

## 2021-09-27 RX ORDER — BUPROPION HYDROCHLORIDE 150 MG/1
150 TABLET ORAL DAILY
Qty: 90 TABLET | Refills: 1 | Status: SHIPPED | OUTPATIENT
Start: 2021-09-27 | End: 2022-02-24

## 2021-10-14 DIAGNOSIS — E11.65 TYPE 2 DIABETES MELLITUS WITH HYPERGLYCEMIA, WITH LONG-TERM CURRENT USE OF INSULIN (HCC): Primary | ICD-10-CM

## 2021-10-14 DIAGNOSIS — Z79.4 TYPE 2 DIABETES MELLITUS WITH HYPERGLYCEMIA, WITH LONG-TERM CURRENT USE OF INSULIN (HCC): Primary | ICD-10-CM

## 2021-10-14 RX ORDER — INSULIN ASPART 100 [IU]/ML
INJECTION, SUSPENSION SUBCUTANEOUS
Qty: 10 PEN | Refills: 6 | Status: SHIPPED | OUTPATIENT
Start: 2021-10-14 | End: 2022-01-13 | Stop reason: SDUPTHER

## 2021-11-08 DIAGNOSIS — I10 HYPERTENSION, UNSPECIFIED TYPE: ICD-10-CM

## 2021-11-08 RX ORDER — AMLODIPINE BESYLATE 10 MG/1
10 TABLET ORAL DAILY
Qty: 90 TABLET | Refills: 0 | Status: SHIPPED | OUTPATIENT
Start: 2021-11-08 | End: 2022-02-07

## 2021-11-18 RX ORDER — SERTRALINE HYDROCHLORIDE 100 MG/1
100 TABLET, FILM COATED ORAL DAILY
Qty: 30 TABLET | Refills: 0 | Status: SHIPPED | OUTPATIENT
Start: 2021-11-18 | End: 2021-12-27

## 2021-12-01 ENCOUNTER — OFFICE (AMBULATORY)
Dept: URBAN - METROPOLITAN AREA CLINIC 64 | Facility: CLINIC | Age: 31
End: 2021-12-01

## 2021-12-01 VITALS
HEART RATE: 108 BPM | SYSTOLIC BLOOD PRESSURE: 170 MMHG | DIASTOLIC BLOOD PRESSURE: 121 MMHG | WEIGHT: 230 LBS | HEIGHT: 65 IN

## 2021-12-01 DIAGNOSIS — R12 HEARTBURN: ICD-10-CM

## 2021-12-01 DIAGNOSIS — K21.9 GASTRO-ESOPHAGEAL REFLUX DISEASE WITHOUT ESOPHAGITIS: ICD-10-CM

## 2021-12-01 PROCEDURE — 99213 OFFICE O/P EST LOW 20 MIN: CPT | Performed by: NURSE PRACTITIONER

## 2021-12-27 RX ORDER — SERTRALINE HYDROCHLORIDE 100 MG/1
100 TABLET, FILM COATED ORAL DAILY
Qty: 30 TABLET | Refills: 0 | Status: SHIPPED | OUTPATIENT
Start: 2021-12-27 | End: 2022-06-03 | Stop reason: SDUPTHER

## 2022-01-06 ENCOUNTER — LAB (OUTPATIENT)
Dept: LAB | Facility: HOSPITAL | Age: 32
End: 2022-01-06

## 2022-01-06 DIAGNOSIS — Z79.4 TYPE 2 DIABETES MELLITUS WITH HYPERGLYCEMIA, WITH LONG-TERM CURRENT USE OF INSULIN: ICD-10-CM

## 2022-01-06 DIAGNOSIS — E11.65 TYPE 2 DIABETES MELLITUS WITH HYPERGLYCEMIA, WITH LONG-TERM CURRENT USE OF INSULIN: ICD-10-CM

## 2022-01-06 LAB
ALBUMIN SERPL-MCNC: 4.5 G/DL (ref 3.5–5.2)
ALBUMIN UR-MCNC: 39.6 MG/DL
ALBUMIN/GLOB SERPL: 1.4 G/DL
ALP SERPL-CCNC: 80 U/L (ref 39–117)
ALT SERPL W P-5'-P-CCNC: 37 U/L (ref 1–33)
ANION GAP SERPL CALCULATED.3IONS-SCNC: 14.8 MMOL/L (ref 5–15)
AST SERPL-CCNC: 23 U/L (ref 1–32)
BILIRUB SERPL-MCNC: 0.6 MG/DL (ref 0–1.2)
BUN SERPL-MCNC: 10 MG/DL (ref 6–20)
BUN/CREAT SERPL: 18.5 (ref 7–25)
CALCIUM SPEC-SCNC: 9.7 MG/DL (ref 8.6–10.5)
CHLORIDE SERPL-SCNC: 95 MMOL/L (ref 98–107)
CHOLEST SERPL-MCNC: 107 MG/DL (ref 0–200)
CO2 SERPL-SCNC: 24.2 MMOL/L (ref 22–29)
CREAT SERPL-MCNC: 0.54 MG/DL (ref 0.57–1)
CREAT UR-MCNC: 37.5 MG/DL
GFR SERPL CREATININE-BSD FRML MDRD: 132 ML/MIN/1.73
GFR SERPL CREATININE-BSD FRML MDRD: >150 ML/MIN/1.73
GLOBULIN UR ELPH-MCNC: 3.2 GM/DL
GLUCOSE SERPL-MCNC: 324 MG/DL (ref 65–99)
HBA1C MFR BLD: 12 % (ref 3.5–5.6)
HDLC SERPL-MCNC: 40 MG/DL (ref 40–60)
LDLC SERPL CALC-MCNC: 39 MG/DL (ref 0–100)
LDLC/HDLC SERPL: 0.85 {RATIO}
MICROALBUMIN/CREAT UR: 1056 MG/G
POTASSIUM SERPL-SCNC: 3.9 MMOL/L (ref 3.5–5.2)
PROT SERPL-MCNC: 7.7 G/DL (ref 6–8.5)
SODIUM SERPL-SCNC: 134 MMOL/L (ref 136–145)
TRIGL SERPL-MCNC: 165 MG/DL (ref 0–150)
VLDLC SERPL-MCNC: 28 MG/DL (ref 5–40)

## 2022-01-06 PROCEDURE — 82570 ASSAY OF URINE CREATININE: CPT

## 2022-01-06 PROCEDURE — 80061 LIPID PANEL: CPT

## 2022-01-06 PROCEDURE — 36415 COLL VENOUS BLD VENIPUNCTURE: CPT

## 2022-01-06 PROCEDURE — 82043 UR ALBUMIN QUANTITATIVE: CPT

## 2022-01-06 PROCEDURE — 80053 COMPREHEN METABOLIC PANEL: CPT

## 2022-01-06 PROCEDURE — 83036 HEMOGLOBIN GLYCOSYLATED A1C: CPT

## 2022-01-12 PROBLEM — N94.819 VULVODYNIA: Status: ACTIVE | Noted: 2020-06-18

## 2022-01-12 PROBLEM — E28.2 POLYCYSTIC OVARY: Status: ACTIVE | Noted: 2019-03-20

## 2022-01-12 PROBLEM — N94.10 PAIN IN FEMALE GENITALIA ON INTERCOURSE: Status: ACTIVE | Noted: 2020-06-19

## 2022-01-12 PROBLEM — R68.82 REDUCED LIBIDO: Status: ACTIVE | Noted: 2020-06-19

## 2022-01-12 PROBLEM — N95.1 MENOPAUSAL SYNDROME: Status: ACTIVE | Noted: 2019-04-10

## 2022-01-13 ENCOUNTER — OFFICE VISIT (OUTPATIENT)
Dept: ENDOCRINOLOGY | Facility: CLINIC | Age: 32
End: 2022-01-13

## 2022-01-13 VITALS
TEMPERATURE: 97.5 F | BODY MASS INDEX: 37.49 KG/M2 | OXYGEN SATURATION: 96 % | SYSTOLIC BLOOD PRESSURE: 130 MMHG | WEIGHT: 225 LBS | HEIGHT: 65 IN | HEART RATE: 136 BPM | DIASTOLIC BLOOD PRESSURE: 80 MMHG

## 2022-01-13 DIAGNOSIS — E11.21 DIABETIC NEPHROPATHY ASSOCIATED WITH TYPE 2 DIABETES MELLITUS: ICD-10-CM

## 2022-01-13 DIAGNOSIS — E78.2 MIXED HYPERLIPIDEMIA: ICD-10-CM

## 2022-01-13 DIAGNOSIS — E66.09 CLASS 2 OBESITY DUE TO EXCESS CALORIES WITHOUT SERIOUS COMORBIDITY WITH BODY MASS INDEX (BMI) OF 37.0 TO 37.9 IN ADULT: ICD-10-CM

## 2022-01-13 DIAGNOSIS — Z79.4 TYPE 2 DIABETES MELLITUS WITH HYPERGLYCEMIA, WITH LONG-TERM CURRENT USE OF INSULIN: Primary | ICD-10-CM

## 2022-01-13 DIAGNOSIS — E11.65 TYPE 2 DIABETES MELLITUS WITH HYPERGLYCEMIA, WITH LONG-TERM CURRENT USE OF INSULIN: Primary | ICD-10-CM

## 2022-01-13 DIAGNOSIS — I10 ESSENTIAL HYPERTENSION: ICD-10-CM

## 2022-01-13 LAB — GLUCOSE BLDC GLUCOMTR-MCNC: 381 MG/DL (ref 70–105)

## 2022-01-13 PROCEDURE — 99214 OFFICE O/P EST MOD 30 MIN: CPT | Performed by: INTERNAL MEDICINE

## 2022-01-13 PROCEDURE — 82962 GLUCOSE BLOOD TEST: CPT | Performed by: INTERNAL MEDICINE

## 2022-01-13 RX ORDER — INSULIN ASPART 100 [IU]/ML
INJECTION, SUSPENSION SUBCUTANEOUS
Qty: 15 PEN | Refills: 6 | Status: SHIPPED | OUTPATIENT
Start: 2022-01-13 | End: 2022-07-29

## 2022-01-13 NOTE — PATIENT INSTRUCTIONS
Change NovoLog Mix 70/30 insulin to 30 units subcu 3 times a day half an hour before each meal  Continue metformin  Please continue to work on your diet and activity  Arrange for comprehensive diabetes education  Arrange for cardiology consultation  Always keep glucose source in case of low blood sugar  Annual eye exam and flu vaccine  Labs before follow-up.

## 2022-01-13 NOTE — PROGRESS NOTES
Endocrine Progress Note Outpatient     Patient Care Team:  Hilda Acosta APRN as PCP - General (Nurse Practitioner)    Chief Complaint: Follow-up type 2 diabetes          HPI: This is a 31-year-old female with history of type 2 diabetes, hypertension, hyperlipidemia, diabetic nephropathy and obesity is here for follow-up.  For type 2 diabetes: She is currently taking metformin 2000 mg daily along with NovoLog Mix 70/30 insulin she takes 40 units in the morning and 20 5 in the evening. We did try to give her Jardiance but due to cost she couldn't afford it. She has unfortunately not been able to follow diet due to the holidays and has not been checking her blood sugars at home either.  Hypertension: Well-controlled  Hyperlipidemia: Currently on atorvastatin  Diabetic nephropathy: Currently on losartan  Obesity: Since last visit her weight is stable.    Past Medical History:   Diagnosis Date   • Diabetes mellitus (HCC) 8/1/2018    Taking metformin and lantus   • Headache    • Hyperlipidemia    • Hypertension    • PCOS (polycystic ovarian syndrome)    • Sleep apnea    • Uterine cancer (HCC)    • Visual impairment        Social History     Socioeconomic History   • Marital status: Single   Tobacco Use   • Smoking status: Never Smoker   • Smokeless tobacco: Never Used   Vaping Use   • Vaping Use: Never used   Substance and Sexual Activity   • Alcohol use: Never     Alcohol/week: 1.0 standard drink     Types: 1 Glasses of wine per week     Comment: It’s probably once a month or less   • Drug use: No   • Sexual activity: Yes     Partners: Male     Birth control/protection: Post-menopausal, Surgical       Family History   Problem Relation Age of Onset   • Colon cancer Mother    • Uterine cancer Mother    • Cancer Mother         pancritic       Allergies   Allergen Reactions   • Lisinopril Cough   • Sulfa Antibiotics Hives   • Amoxicillin Rash   • Penicillins Rash     BMI: 37.4  ROS:   Constitutional:  Admit fatigue,  tiredness.    Eyes:  Denies change in visual acuity   HENT:  Denies nasal congestion or sore throat   Respiratory: denies cough, shortness of breath.   Cardiovascular:  denies chest pain, edema   GI:  Denies abdominal pain, nausea, vomiting.   Musculoskeletal:  Denies back pain or joint pain   Integument:  Denies dry skin and rash   Neurologic:  Denies headache, focal weakness or sensory changes   Endocrine:  Denies polyuria or polydipsia   Psychiatric:  Admit depression and anxiety      Vitals:    01/13/22 1421   BP: 130/80   Pulse: (!) 136   Temp: 97.5 °F (36.4 °C)   SpO2: 96%     Body mass index is 37.44 kg/m².     Physical Exam:  GEN: NAD, conversant, obese  EYES: EOMI, PERRL, no conjunctival erythema  NECK: no thyromegaly, full ROM   CV: RRR, no murmurs/rubs/gallops, no peripheral edema  LUNG: CTAB, no wheezes/rales/ronchi  SKIN: no rashes, no acanthosis  MSK: no deformities, full ROM of all extremities  NEURO: no tremors, DTR normal  PSYCH: AOX3, appropriate mood, affect normal      Results Review:     I reviewed the patient's new clinical results.    Lab Results   Component Value Date    HGBA1C 12.0 (H) 01/06/2022    HGBA1C 11.3 (H) 07/07/2021    HGBA1C 10.6 (H) 01/12/2021      Lab Results   Component Value Date    GLUCOSE 324 (H) 01/06/2022    BUN 10 01/06/2022    CREATININE 0.54 (L) 01/06/2022    EGFRIFNONA 132 01/06/2022    EGFRIFAFRI >150 01/06/2022    BCR 18.5 01/06/2022    K 3.9 01/06/2022    CO2 24.2 01/06/2022    CALCIUM 9.7 01/06/2022    ALBUMIN 4.50 01/06/2022    LABIL2 1.1 03/12/2019    AST 23 01/06/2022    ALT 37 (H) 01/06/2022    CHOL 107 01/06/2022    TRIG 165 (H) 01/06/2022    LDL 39 01/06/2022    HDL 40 01/06/2022     Lab Results   Component Value Date    TSH 1.480 07/31/2019     Hemoglobin A1c (01/06/2022 08:05)   Lipid Panel (01/06/2022 08:05)   Comprehensive Metabolic Panel (01/06/2022 08:05)   Microalbumin / Creatinine Urine Ratio - Urine, Clean Catch (01/06/2022 08:05)       Medication  Review: Reviewed.       Current Outpatient Medications:   •  ACCU-CHEK VALENTINE PLUS test strip, 2 (Two) Times a Day. for testing, Disp: , Rfl: 3  •  ACCU-CHEK SOFTCLIX LANCETS lancets, TEST BS BID, Disp: , Rfl: 3  •  amLODIPine (NORVASC) 10 MG tablet, Take 1 tablet by mouth Daily., Disp: 90 tablet, Rfl: 0  •  atorvastatin (LIPITOR) 40 MG tablet, Take 1 tablet by mouth Daily., Disp: 90 tablet, Rfl: 1  •  B-D UF III MINI PEN NEEDLES 31G X 5 MM misc, USE ONCE DAILY AS DIRECTED, Disp: 100 each, Rfl: 0  •  Blood Glucose Monitoring Suppl (ACCU-CHEK VALENTINE PLUS) w/Device kit, TEST BLOOD SUGAR BID, Disp: , Rfl: 0  •  buPROPion XL (WELLBUTRIN XL) 150 MG 24 hr tablet, TAKE 1 TABLET BY MOUTH DAILY, Disp: 90 tablet, Rfl: 1  •  Calcium 150 MG tablet, 600/400mg, Disp: , Rfl: 11  •  fluconazole (DIFLUCAN) 200 MG tablet, Take 200 mg by mouth Every 3 (Three) Days., Disp: , Rfl:   •  hydrOXYzine pamoate (VISTARIL) 25 MG capsule, TAKE 1 CAPSULE BY MOUTH THREE TIMES DAILY AS NEEDED FOR ANXIETY, Disp: 30 capsule, Rfl: 0  •  losartan-hydrochlorothiazide (Hyzaar) 100-25 MG per tablet, Take 1 tablet by mouth Daily., Disp: 90 tablet, Rfl: 1  •  metFORMIN ER (GLUCOPHAGE-XR) 500 MG 24 hr tablet, 2 tablets twice a day., Disp: 360 tablet, Rfl: 4  •  NovoLOG Mix 70/30 FlexPen (70-30) 100 UNIT/ML suspension pen-injector injection, Inject 40 units before breakfast and 25 before supper. Dispense 30 mL every 60 days, Disp: 10 pen, Rfl: 6  •  sertraline (ZOLOFT) 100 MG tablet, TAKE 1 TABLET BY MOUTH DAILY, Disp: 30 tablet, Rfl: 0  •  traMADol (ULTRAM) 50 MG tablet, Take 1 tablet by mouth Every 6 (Six) Hours As Needed for Moderate Pain ., Disp: 11 tablet, Rfl: 0      Assessment/Plan   1.  Diabetes mellitus type 2: Uncontrolled with significant hyperglycemia, she is advised to work on her diet, will change NovoLog Mix 70/30 insulin to 30 units 3 times a day before each meal and continue metformin 500 mg, 2 tablets twice a day. She is advised to check  blood sugars at least 3 times a day before each meal and send me the records for review.    2. Hypertension: Well-controlled, she does have tachycardia, she tells me she had 2 EKGs which was fine and she has anxiety. I will refer her to cardiology for further evaluation.    3. Hyperlipidemia: Well-controlled with atorvastatin, continue current medication    4. Diabetic nephropathy: Currently on losartan, follow urine microalbumin and creatinine ratio    5. Obesity: She needs to work on her diet and activity.            Eulalio Lopez MD FACE.

## 2022-01-14 ENCOUNTER — CONSULT (OUTPATIENT)
Dept: CARDIOLOGY | Facility: CLINIC | Age: 32
End: 2022-01-14

## 2022-01-14 VITALS
BODY MASS INDEX: 37.49 KG/M2 | WEIGHT: 225 LBS | RESPIRATION RATE: 18 BRPM | OXYGEN SATURATION: 99 % | HEIGHT: 65 IN | SYSTOLIC BLOOD PRESSURE: 157 MMHG | DIASTOLIC BLOOD PRESSURE: 112 MMHG | HEART RATE: 88 BPM

## 2022-01-14 DIAGNOSIS — E66.09 CLASS 2 OBESITY DUE TO EXCESS CALORIES WITHOUT SERIOUS COMORBIDITY WITH BODY MASS INDEX (BMI) OF 37.0 TO 37.9 IN ADULT: ICD-10-CM

## 2022-01-14 DIAGNOSIS — E28.2 POLYCYSTIC OVARY: ICD-10-CM

## 2022-01-14 DIAGNOSIS — Z79.4 TYPE 2 DIABETES MELLITUS WITH HYPERGLYCEMIA, WITH LONG-TERM CURRENT USE OF INSULIN: ICD-10-CM

## 2022-01-14 DIAGNOSIS — E11.65 TYPE 2 DIABETES MELLITUS WITH HYPERGLYCEMIA, WITH LONG-TERM CURRENT USE OF INSULIN: ICD-10-CM

## 2022-01-14 DIAGNOSIS — R00.0 TACHYCARDIA: Primary | ICD-10-CM

## 2022-01-14 DIAGNOSIS — E78.2 MIXED HYPERLIPIDEMIA: ICD-10-CM

## 2022-01-14 DIAGNOSIS — I10 ESSENTIAL HYPERTENSION: ICD-10-CM

## 2022-01-14 PROCEDURE — 93000 ELECTROCARDIOGRAM COMPLETE: CPT | Performed by: INTERNAL MEDICINE

## 2022-01-14 PROCEDURE — 99203 OFFICE O/P NEW LOW 30 MIN: CPT | Performed by: INTERNAL MEDICINE

## 2022-01-14 NOTE — PROGRESS NOTES
Answers for HPI/ROS submitted by the patient on 1/14/2022  Please describe your symptoms.: High pulse rate  Have you had these symptoms before?: Yes  How long have you been having these symptoms?: Greater than 2 weeks  Please list any medications you are currently taking for this condition.: Amlodipine and losartqn hctz  Please describe any probable cause for these symptoms. : Unsure  What is the primary reason for your visit?: Other    Cardiology Office Visit      Encounter Date:  01/14/2022    Patient ID:   Yuko Lopez is a 31 y.o. female.    Reason For Followup:  Tachycardia    Brief Clinical History:  Dear Hilda Alegre APRN    I had the pleasure of seeing Yuko Lopez today. As you are well aware, this is a 31 y.o. female past medical history there is significant for history of poorly controlled diabetes hypertension diabetic nephropathy obesity and increased heart rate last few times during the office visit was sent for evaluation for further treatment options for tachycardia    Interval History:  Poorly controlled diabetes for the last 2 years with elevated hemoglobin A1c  Strong family history of premature coronary artery disease  Increased heart rate during last few office visits with no EKG today EKG done in the office shows sinus rhythm with a heart rate of 96  Likely sinus tachycardia  Denies any symptoms of palpitations dizziness or syncope  No orthopnea no PND  Strong family history of premature coronary artery disease  No prior cardiac evaluation or work-up  Assessment & Plan    Impressions:  Tachycardia intermittently likely sinus tachycardia  Strong family history of premature coronary artery disease  Poorly controlled diabetes  Hyperlipidemia improved with statin  Hypertension better controlled with current medical therapy  Prior history of uterine cancer status post surgical resection  Diabetic nephropathy  Obesity/Body mass index is 37.44 kg/m².    Recommendations:  Need for  "regular exercise and weight loss reviewed and discussed with patient  Patient is advised to consider Holter monitor for 48 hours she prefers to have blood pressure and heart rate monitoring at home and to follow-up secondary to high deductible  Likely will benefit from a vascular screening and coronary artery calcium score for further restratification with a family history of premature coronary artery disease and multiple cardiac risk factors  Regular exercise and weight loss  Home blood pressure and heart rate monitoring in the last few days and call back the office with readings  Continued aggressive risk factor modification and need for better control of risk factors including blood pressure and diabetes reviewed and discussed with patient  Recent labs and work-up that was done reviewed and discussed with the patient  She has increased heart rate even at home consider Holter monitor for 48 hours to rule out cardiac arrhythmia  Follow-up in office in 3 months    Objective:    Vitals:  Vitals:    01/14/22 1101   BP: (!) 157/112   BP Location: Left arm   Patient Position: Sitting   Cuff Size: Large Adult   Pulse: 88   Resp: 18   SpO2: 99%   Weight: 102 kg (225 lb)   Height: 165.1 cm (65\")       Physical Exam:    General: Alert, cooperative, no distress, appears stated age  Head:  Normocephalic, atraumatic, mucous membranes moist  Eyes:  Conjunctiva/corneas clear, EOM's intact     Neck:  Supple,  no adenopathy;      Lungs: Clear to auscultation bilaterally, no wheezes rhonchi rales are noted  Chest wall: No tenderness  Heart::  Regular rate and rhythm, S1 and S2 normal, no murmur, rub or gallop  Abdomen: Soft, non-tender, nondistended bowel sounds active  Extremities: No cyanosis, clubbing, or edema  Pulses: 2+ and symmetric all extremities  Skin:  No rashes or lesions  Neuro/psych: A&O x3. CN II through XII are grossly intact with appropriate affect      Allergies:  Allergies   Allergen Reactions   • Lisinopril " Cough   • Sulfa Antibiotics Hives   • Amoxicillin Rash   • Penicillins Rash       Medication Review:     Current Outpatient Medications:   •  ACCU-CHEK VALENTINE PLUS test strip, 2 (Two) Times a Day. for testing, Disp: , Rfl: 3  •  ACCU-CHEK SOFTCLIX LANCETS lancets, TEST BS BID, Disp: , Rfl: 3  •  amLODIPine (NORVASC) 10 MG tablet, Take 1 tablet by mouth Daily., Disp: 90 tablet, Rfl: 0  •  atorvastatin (LIPITOR) 40 MG tablet, Take 1 tablet by mouth Daily., Disp: 90 tablet, Rfl: 1  •  B-D UF III MINI PEN NEEDLES 31G X 5 MM misc, USE ONCE DAILY AS DIRECTED, Disp: 100 each, Rfl: 0  •  Blood Glucose Monitoring Suppl (ACCU-CHEK VALENTINE PLUS) w/Device kit, TEST BLOOD SUGAR BID, Disp: , Rfl: 0  •  buPROPion XL (WELLBUTRIN XL) 150 MG 24 hr tablet, TAKE 1 TABLET BY MOUTH DAILY, Disp: 90 tablet, Rfl: 1  •  Calcium 150 MG tablet, 600/400mg, Disp: , Rfl: 11  •  fluconazole (DIFLUCAN) 200 MG tablet, Take 200 mg by mouth Every 3 (Three) Days., Disp: , Rfl:   •  hydrOXYzine pamoate (VISTARIL) 25 MG capsule, TAKE 1 CAPSULE BY MOUTH THREE TIMES DAILY AS NEEDED FOR ANXIETY, Disp: 30 capsule, Rfl: 0  •  losartan-hydrochlorothiazide (Hyzaar) 100-25 MG per tablet, Take 1 tablet by mouth Daily., Disp: 90 tablet, Rfl: 1  •  metFORMIN ER (GLUCOPHAGE-XR) 500 MG 24 hr tablet, 2 tablets twice a day., Disp: 360 tablet, Rfl: 4  •  NovoLOG Mix 70/30 FlexPen (70-30) 100 UNIT/ML suspension pen-injector injection, Check 30 units subcu 3 times a day before each meal, Disp: 15 pen, Rfl: 6  •  sertraline (ZOLOFT) 100 MG tablet, TAKE 1 TABLET BY MOUTH DAILY, Disp: 30 tablet, Rfl: 0  •  traMADol (ULTRAM) 50 MG tablet, Take 1 tablet by mouth Every 6 (Six) Hours As Needed for Moderate Pain ., Disp: 11 tablet, Rfl: 0    Family History:  Family History   Problem Relation Age of Onset   • Colon cancer Mother    • Uterine cancer Mother    • Cancer Mother         pancritic       Past Medical History:  Past Medical History:   Diagnosis Date   • Diabetes mellitus  (HCC) 8/1/2018    Taking metformin and lantus   • Headache    • Hyperlipidemia    • Hypertension    • PCOS (polycystic ovarian syndrome)    • Sleep apnea    • Uterine cancer (HCC)    • Visual impairment        Past surgical History:  Past Surgical History:   Procedure Laterality Date   • HYSTERECTOMY         Social History:  Social History     Socioeconomic History   • Marital status: Single   Tobacco Use   • Smoking status: Never Smoker   • Smokeless tobacco: Never Used   Vaping Use   • Vaping Use: Never used   Substance and Sexual Activity   • Alcohol use: Never     Alcohol/week: 1.0 standard drink     Types: 1 Glasses of wine per week     Comment: It’s probably once a month or less   • Drug use: No   • Sexual activity: Yes     Partners: Male     Birth control/protection: Post-menopausal, Surgical       Review of Systems:  The following systems were reviewed as they relate to the cardiovascular system: Constitutional, Eyes, ENT, Cardiovascular, Respiratory, Gastrointestinal, Integumentary, Neurological, Psychiatric, Hematologic, Endocrine, Musculoskeletal, and Genitourinary. The pertinent cardiovascular findings are reported above with all other cardiovascular points within those systems being negative.    Diagnostic Study Review:     Current Electrocardiogram:    ECG 12 Lead    Date/Time: 1/14/2022 11:31 AM  Performed by: Glynn Holland MD  Authorized by: Glynn Holland MD   Comparison: compared with previous ECG   Similar to previous ECG  Rhythm: sinus rhythm  Rate: normal  BPM: 96  Conduction: conduction normal  ST Segments: ST segments normal  T Waves: T waves normal  QRS axis: normal    Clinical impression: normal ECG              NOTE: The following portions of the patient's history were reviewed and updated this visit as appropriate: allergies, current medications, past family history, past medical history, past social history, past surgical history and problem list.

## 2022-01-26 ENCOUNTER — OFFICE VISIT (OUTPATIENT)
Dept: ENDOCRINOLOGY | Facility: CLINIC | Age: 32
End: 2022-01-26

## 2022-01-26 DIAGNOSIS — E11.65 TYPE 2 DIABETES MELLITUS WITH HYPERGLYCEMIA, WITH LONG-TERM CURRENT USE OF INSULIN: ICD-10-CM

## 2022-01-26 DIAGNOSIS — Z79.4 TYPE 2 DIABETES MELLITUS WITH HYPERGLYCEMIA, WITH LONG-TERM CURRENT USE OF INSULIN: ICD-10-CM

## 2022-01-26 PROCEDURE — G0108 DIAB MANAGE TRN  PER INDIV: HCPCS | Performed by: DIETITIAN, REGISTERED

## 2022-02-02 VITALS — BODY MASS INDEX: 37.61 KG/M2 | WEIGHT: 226 LBS

## 2022-02-02 NOTE — PROGRESS NOTES
Pt attended initial assessment for 1 hour. Discussed pathophysiology of diabetes, s/s hypo/hyperglycemia & tx, medications and side effects, insulin injection technique, BG monitoring technique (pt obtained BG of 292), complications of diabetes. Pt set goals and signed up for Day Class 1.

## 2022-02-05 DIAGNOSIS — I10 HYPERTENSION, UNSPECIFIED TYPE: ICD-10-CM

## 2022-02-07 RX ORDER — AMLODIPINE BESYLATE 10 MG/1
10 TABLET ORAL DAILY
Qty: 90 TABLET | Refills: 0 | Status: SHIPPED | OUTPATIENT
Start: 2022-02-07 | End: 2022-05-25

## 2022-02-09 ENCOUNTER — OFFICE VISIT (OUTPATIENT)
Dept: ENDOCRINOLOGY | Facility: CLINIC | Age: 32
End: 2022-02-09

## 2022-02-09 DIAGNOSIS — Z79.4 TYPE 2 DIABETES MELLITUS WITH HYPERGLYCEMIA, WITH LONG-TERM CURRENT USE OF INSULIN: ICD-10-CM

## 2022-02-09 DIAGNOSIS — E11.65 TYPE 2 DIABETES MELLITUS WITH HYPERGLYCEMIA, WITH LONG-TERM CURRENT USE OF INSULIN: ICD-10-CM

## 2022-02-09 PROCEDURE — G0109 DIAB MANAGE TRN IND/GROUP: HCPCS | Performed by: DIETITIAN, REGISTERED

## 2022-02-24 ENCOUNTER — OFFICE VISIT (OUTPATIENT)
Dept: FAMILY MEDICINE CLINIC | Facility: CLINIC | Age: 32
End: 2022-02-24

## 2022-02-24 VITALS
BODY MASS INDEX: 37.77 KG/M2 | HEART RATE: 93 BPM | OXYGEN SATURATION: 98 % | WEIGHT: 227 LBS | TEMPERATURE: 98 F | SYSTOLIC BLOOD PRESSURE: 154 MMHG | DIASTOLIC BLOOD PRESSURE: 109 MMHG

## 2022-02-24 DIAGNOSIS — E78.5 HYPERLIPIDEMIA, UNSPECIFIED HYPERLIPIDEMIA TYPE: ICD-10-CM

## 2022-02-24 DIAGNOSIS — F33.1 MODERATE EPISODE OF RECURRENT MAJOR DEPRESSIVE DISORDER: ICD-10-CM

## 2022-02-24 DIAGNOSIS — E11.65 TYPE 2 DIABETES MELLITUS WITH HYPERGLYCEMIA, WITHOUT LONG-TERM CURRENT USE OF INSULIN: ICD-10-CM

## 2022-02-24 DIAGNOSIS — I10 HYPERTENSION, UNSPECIFIED TYPE: Primary | ICD-10-CM

## 2022-02-24 DIAGNOSIS — I10 HYPERTENSION, UNSPECIFIED TYPE: ICD-10-CM

## 2022-02-24 PROCEDURE — 99214 OFFICE O/P EST MOD 30 MIN: CPT | Performed by: NURSE PRACTITIONER

## 2022-02-24 RX ORDER — HYDRALAZINE HYDROCHLORIDE 25 MG/1
TABLET, FILM COATED ORAL
Qty: 180 TABLET | OUTPATIENT
Start: 2022-02-24

## 2022-02-24 RX ORDER — HYDRALAZINE HYDROCHLORIDE 25 MG/1
25 TABLET, FILM COATED ORAL 2 TIMES DAILY
Qty: 60 TABLET | Refills: 0 | Status: SHIPPED | OUTPATIENT
Start: 2022-02-24 | End: 2022-03-27

## 2022-02-24 RX ORDER — BUPROPION HYDROCHLORIDE 300 MG/1
300 TABLET ORAL DAILY
Qty: 30 TABLET | Refills: 2 | Status: SHIPPED | OUTPATIENT
Start: 2022-02-24 | End: 2022-06-03 | Stop reason: SDUPTHER

## 2022-02-24 RX ORDER — ATORVASTATIN CALCIUM 40 MG/1
40 TABLET, FILM COATED ORAL DAILY
Qty: 90 TABLET | Refills: 1 | Status: SHIPPED | OUTPATIENT
Start: 2022-02-24 | End: 2022-12-27

## 2022-02-24 NOTE — PROGRESS NOTES
Subjective     Yuko Lopez is a 31 y.o. female.     Patient is here today for a follow-up on hypertension, hyperlipidemia, diabetes, and depression/anxiety    Hypertension-patient is currently on Norvasc 10 mg daily and losartan-hydrochlorothiazide 100-25 mg daily. She states that she doesn't monitor her BP at home. She has been to cardiology.  Denies CP, SOA, dizziness, HA.     Hyperlipidemia-patient is currently on Lipitor 40 mg daily.she has been working on her sugars    Diabetes-patient sees endocrinology for this.  She is currently on metformin XR 1000 mg twice daily and NovoLog 70/30 40 units in am and 30 units in the evening. She went on vacation and hasnt monitored it for the last few days.  She has been trying harder before that.  Goal at this time is to stay under 250 and she has been doing better.  She went to a DM education class which was helpful.     Depression/anxiety-patient is currently on Wellbutrin  mg daily and Zoloft 100 mg daily. Pt states that she has been really tired the last few weeks.  She is taking the wellbutrin in the morning and the zoloft at night.  She gets a full night of sleep.  She would like to try increasing the wellbutrin. It gave her more energy previously. Denies any SI or HI.     Labs- UTD  Pap smear- due  Colonoscopy- 8/30/21           The following portions of the patient's history were reviewed and updated as appropriate: allergies, current medications, past family history, past medical history, past social history, past surgical history and problem list.    Review of Systems   Constitutional: Positive for fatigue. Negative for chills and fever.   Respiratory: Negative for chest tightness and shortness of breath.    Cardiovascular: Negative for chest pain and palpitations.   Gastrointestinal: Negative for abdominal pain, nausea and vomiting.   Endocrine: Negative for polydipsia, polyphagia and polyuria.   Neurological: Negative for dizziness and headache.    Psychiatric/Behavioral: Positive for depressed mood. Negative for self-injury, suicidal ideas and stress. The patient is not nervous/anxious.        Objective     BP (!) 154/109 (BP Location: Right arm, Patient Position: Sitting, Cuff Size: Large Adult)   Pulse 93   Temp 98 °F (36.7 °C) (Tympanic)   Wt 103 kg (227 lb)   SpO2 98%   BMI 37.77 kg/m²     Current Outpatient Medications on File Prior to Visit   Medication Sig Dispense Refill   • ACCU-CHEK VALENTINE PLUS test strip 2 (Two) Times a Day. for testing  3   • ACCU-CHEK SOFTCLIX LANCETS lancets TEST BS BID  3   • amLODIPine (NORVASC) 10 MG tablet TAKE 1 TABLET BY MOUTH DAILY 90 tablet 0   • B-D UF III MINI PEN NEEDLES 31G X 5 MM misc USE ONCE DAILY AS DIRECTED 100 each 0   • Blood Glucose Monitoring Suppl (ACCU-CHEK VALENTINE PLUS) w/Device kit TEST BLOOD SUGAR BID  0   • Calcium 150 MG tablet 600/400mg  11   • fluconazole (DIFLUCAN) 200 MG tablet Take 200 mg by mouth Every 3 (Three) Days.     • hydrOXYzine pamoate (VISTARIL) 25 MG capsule TAKE 1 CAPSULE BY MOUTH THREE TIMES DAILY AS NEEDED FOR ANXIETY 30 capsule 0   • losartan-hydrochlorothiazide (Hyzaar) 100-25 MG per tablet Take 1 tablet by mouth Daily. 90 tablet 1   • metFORMIN ER (GLUCOPHAGE-XR) 500 MG 24 hr tablet 2 tablets twice a day. 360 tablet 4   • NovoLOG Mix 70/30 FlexPen (70-30) 100 UNIT/ML suspension pen-injector injection Check 30 units subcu 3 times a day before each meal 15 pen 6   • sertraline (ZOLOFT) 100 MG tablet TAKE 1 TABLET BY MOUTH DAILY 30 tablet 0   • traMADol (ULTRAM) 50 MG tablet Take 1 tablet by mouth Every 6 (Six) Hours As Needed for Moderate Pain . 11 tablet 0   • [DISCONTINUED] atorvastatin (LIPITOR) 40 MG tablet Take 1 tablet by mouth Daily. 90 tablet 1   • [DISCONTINUED] buPROPion XL (WELLBUTRIN XL) 150 MG 24 hr tablet TAKE 1 TABLET BY MOUTH DAILY 90 tablet 1     No current facility-administered medications on file prior to visit.        Physical Exam  Vitals reviewed.    Constitutional:       General: She is not in acute distress.     Appearance: Normal appearance. She is well-developed. She is not diaphoretic.   HENT:      Head: Normocephalic and atraumatic.   Eyes:      General:         Right eye: No discharge.         Left eye: No discharge.      Extraocular Movements: Extraocular movements intact.      Conjunctiva/sclera: Conjunctivae normal.   Cardiovascular:      Rate and Rhythm: Normal rate and regular rhythm.      Heart sounds: No murmur heard.      Pulmonary:      Effort: Pulmonary effort is normal. No respiratory distress.      Breath sounds: Normal breath sounds. No wheezing or rales.   Abdominal:      General: Bowel sounds are normal.      Palpations: Abdomen is soft.   Musculoskeletal:         General: Normal range of motion.      Cervical back: Normal range of motion.   Skin:     General: Skin is warm and dry.   Neurological:      General: No focal deficit present.      Mental Status: She is alert and oriented to person, place, and time.   Psychiatric:         Mood and Affect: Mood normal.         Behavior: Behavior normal.         Thought Content: Thought content normal.         Judgment: Judgment normal.           Assessment/Plan     Diagnoses and all orders for this visit:    1. Hypertension, unspecified type (Primary)  Comments:  deteriorated  seeing cardiology now  cont current meds  add in hydralazine 25mg bid  f/u 1 mo  Orders:  -     hydrALAZINE (APRESOLINE) 25 MG tablet; Take 1 tablet by mouth 2 (Two) Times a Day.  Dispense: 60 tablet; Refill: 0    2. Hyperlipidemia, unspecified hyperlipidemia type  Comments:  stable  cont lipitor  work on diet and exercise    3. Type 2 diabetes mellitus with hyperglycemia, without long-term current use of insulin (HCC)  Comments:  stable  sees endo  working on lowering sugars    4. Moderate episode of recurrent major depressive disorder (HCC)  Comments:  stable  would like to try increasing wellbutrin to 300mg  cont  zoloft  denies SI or HI  Orders:  -     buPROPion XL (Wellbutrin XL) 300 MG 24 hr tablet; Take 1 tablet by mouth Daily.  Dispense: 30 tablet; Refill: 2

## 2022-02-24 NOTE — PATIENT INSTRUCTIONS
Work on diet and exercise  Schedule pap  Increase wellbutrin to 300mg  Add in hydralazine 25mg 2 times daily  Monitor BP

## 2022-03-01 DIAGNOSIS — F41.9 ANXIETY: ICD-10-CM

## 2022-03-02 RX ORDER — HYDROXYZINE PAMOATE 25 MG/1
CAPSULE ORAL
Qty: 30 CAPSULE | Refills: 0 | Status: SHIPPED | OUTPATIENT
Start: 2022-03-02 | End: 2023-04-05

## 2022-03-14 ENCOUNTER — OFFICE VISIT (OUTPATIENT)
Dept: ENDOCRINOLOGY | Facility: CLINIC | Age: 32
End: 2022-03-14

## 2022-03-14 DIAGNOSIS — Z79.4 TYPE 2 DIABETES MELLITUS WITH HYPERGLYCEMIA, WITH LONG-TERM CURRENT USE OF INSULIN: ICD-10-CM

## 2022-03-14 DIAGNOSIS — E11.65 TYPE 2 DIABETES MELLITUS WITH HYPERGLYCEMIA, WITH LONG-TERM CURRENT USE OF INSULIN: ICD-10-CM

## 2022-03-14 PROCEDURE — G0109 DIAB MANAGE TRN IND/GROUP: HCPCS | Performed by: DIETITIAN, REGISTERED

## 2022-03-16 NOTE — PROGRESS NOTES
Pt attended day class 2 for 4 hours. See education record 1. Pt will continue to follow with MD and Miranda will send a letter in 12 months to sign up for refresher classes.

## 2022-03-17 ENCOUNTER — TELEPHONE (OUTPATIENT)
Dept: ENDOCRINOLOGY | Facility: CLINIC | Age: 32
End: 2022-03-17

## 2022-03-26 DIAGNOSIS — I10 HYPERTENSION, UNSPECIFIED TYPE: ICD-10-CM

## 2022-03-27 DIAGNOSIS — I10 HYPERTENSION, UNSPECIFIED TYPE: ICD-10-CM

## 2022-03-27 RX ORDER — HYDRALAZINE HYDROCHLORIDE 25 MG/1
TABLET, FILM COATED ORAL
Qty: 60 TABLET | Refills: 0 | Status: SHIPPED | OUTPATIENT
Start: 2022-03-27 | End: 2022-03-28

## 2022-03-28 RX ORDER — HYDRALAZINE HYDROCHLORIDE 25 MG/1
TABLET, FILM COATED ORAL
Qty: 180 TABLET | Refills: 0 | Status: SHIPPED | OUTPATIENT
Start: 2022-03-28 | End: 2022-06-24

## 2022-03-28 NOTE — PROGRESS NOTES
"Subjective     Yuko Lopez is a 31 y.o. female.     Pt is here today for a 1 mo follow up on HTN.    HTN- pt is currently on norvasc 10mg daily, losartan-Hctz 100-25mg, and hydralazine 25mg bid. Pt states that she is doing well with the medications. She denies any side effects or concerns. Pt monitors her BP on occasional at home. They have been averaging 120s/80s. She denies chest pain, SOB, dizziness, or headaches. She is slightly tachy today. Has seen cardio for this.     Depression- pt is currently on zoloft 100mg daily. Last visit her wellbutrin was increased from 150 to 300mg. She is doing well on medication. Denies SI or HI.     Pt states that she woke up today feeling slightly nauseated.  She states that she is also having some diarrhea and fatigue.  She states that she took her Metformin dose late and thinks that could be why.  Pt states that she has Zofran and promethazine at home.        The following portions of the patient's history were reviewed and updated as appropriate: allergies, current medications, past family history, past medical history, past social history, past surgical history and problem list.    Review of Systems   Constitutional: Positive for fatigue. Negative for chills and fever.   Eyes: Negative for blurred vision and double vision.   Respiratory: Negative for cough, chest tightness, shortness of breath and wheezing.    Cardiovascular: Negative for chest pain and palpitations.   Gastrointestinal: Positive for diarrhea and nausea. Negative for abdominal pain, constipation and vomiting.   Neurological: Negative for dizziness, light-headedness and headache.       Objective     /88 (BP Location: Left arm, Patient Position: Sitting, Cuff Size: Adult)   Pulse 108   Temp 98 °F (36.7 °C) (Temporal)   Resp 16   Ht 165.1 cm (65\")   Wt 103 kg (228 lb)   SpO2 96%   BMI 37.94 kg/m²     Current Outpatient Medications on File Prior to Visit   Medication Sig Dispense Refill   • " ACCU-CHEK VALENTINE PLUS test strip 2 (Two) Times a Day. for testing  3   • ACCU-CHEK SOFTCLIX LANCETS lancets TEST BS BID  3   • amLODIPine (NORVASC) 10 MG tablet TAKE 1 TABLET BY MOUTH DAILY 90 tablet 0   • atorvastatin (LIPITOR) 40 MG tablet TAKE 1 TABLET BY MOUTH DAILY 90 tablet 1   • B-D UF III MINI PEN NEEDLES 31G X 5 MM misc USE ONCE DAILY AS DIRECTED 100 each 0   • Blood Glucose Monitoring Suppl (ACCU-CHEK VALENTINE PLUS) w/Device kit TEST BLOOD SUGAR BID  0   • buPROPion XL (Wellbutrin XL) 300 MG 24 hr tablet Take 1 tablet by mouth Daily. 30 tablet 2   • Calcium 150 MG tablet 600/400mg  11   • fluconazole (DIFLUCAN) 200 MG tablet Take 200 mg by mouth Every 3 (Three) Days.     • hydrALAZINE (APRESOLINE) 25 MG tablet TAKE 1 TABLET BY MOUTH TWICE DAILY 180 tablet 0   • hydrOXYzine pamoate (VISTARIL) 25 MG capsule TAKE 1 CAPSULE BY MOUTH THREE TIMES DAILY AS NEEDED FOR ANXIETY 30 capsule 0   • losartan-hydrochlorothiazide (Hyzaar) 100-25 MG per tablet Take 1 tablet by mouth Daily. 90 tablet 1   • metFORMIN ER (GLUCOPHAGE-XR) 500 MG 24 hr tablet 2 tablets twice a day. 360 tablet 4   • NovoLOG Mix 70/30 FlexPen (70-30) 100 UNIT/ML suspension pen-injector injection Check 30 units subcu 3 times a day before each meal 15 pen 6   • sertraline (ZOLOFT) 100 MG tablet TAKE 1 TABLET BY MOUTH DAILY 30 tablet 0   • traMADol (ULTRAM) 50 MG tablet Take 1 tablet by mouth Every 6 (Six) Hours As Needed for Moderate Pain . 11 tablet 0     No current facility-administered medications on file prior to visit.        Physical Exam  Constitutional:       General: She is not in acute distress.     Appearance: Normal appearance. She is not ill-appearing.   HENT:      Head: Normocephalic and atraumatic.   Cardiovascular:      Rate and Rhythm: Regular rhythm. Tachycardia present.      Pulses: Normal pulses.      Heart sounds: Normal heart sounds. No murmur heard.  Pulmonary:      Effort: Pulmonary effort is normal. No respiratory distress.       Breath sounds: Normal breath sounds. No wheezing.   Abdominal:      General: Bowel sounds are normal. There is no distension.      Palpations: Abdomen is soft.      Tenderness: There is no abdominal tenderness.   Musculoskeletal:         General: Normal range of motion.   Skin:     General: Skin is warm and dry.   Neurological:      General: No focal deficit present.      Mental Status: She is alert and oriented to person, place, and time.   Psychiatric:         Mood and Affect: Mood normal.         Behavior: Behavior normal.         Thought Content: Thought content normal.         Judgment: Judgment normal.           Assessment/Plan     Diagnoses and all orders for this visit:    1. Hypertension, unspecified type (Primary)  Comments:  Stable  cont meds  work on diet/exercise  f/u in 6 months     2. Nausea  Comments:  Rest  push fluids  take Metformin with food      3. Moderate episode of recurrent major depressive disorder (HCC)  Comments:  improved  cont meds  denies SI or HI

## 2022-03-29 ENCOUNTER — OFFICE VISIT (OUTPATIENT)
Dept: FAMILY MEDICINE CLINIC | Facility: CLINIC | Age: 32
End: 2022-03-29

## 2022-03-29 VITALS
TEMPERATURE: 98 F | OXYGEN SATURATION: 96 % | DIASTOLIC BLOOD PRESSURE: 88 MMHG | RESPIRATION RATE: 16 BRPM | HEART RATE: 108 BPM | BODY MASS INDEX: 37.99 KG/M2 | SYSTOLIC BLOOD PRESSURE: 114 MMHG | WEIGHT: 228 LBS | HEIGHT: 65 IN

## 2022-03-29 DIAGNOSIS — R11.0 NAUSEA: ICD-10-CM

## 2022-03-29 DIAGNOSIS — F33.1 MODERATE EPISODE OF RECURRENT MAJOR DEPRESSIVE DISORDER: ICD-10-CM

## 2022-03-29 DIAGNOSIS — I10 HYPERTENSION, UNSPECIFIED TYPE: Primary | ICD-10-CM

## 2022-03-29 PROCEDURE — 99213 OFFICE O/P EST LOW 20 MIN: CPT | Performed by: NURSE PRACTITIONER

## 2022-04-11 ENCOUNTER — OFFICE (AMBULATORY)
Dept: URBAN - METROPOLITAN AREA CLINIC 64 | Facility: CLINIC | Age: 32
End: 2022-04-11

## 2022-04-11 VITALS
SYSTOLIC BLOOD PRESSURE: 140 MMHG | HEART RATE: 107 BPM | DIASTOLIC BLOOD PRESSURE: 96 MMHG | HEIGHT: 65 IN | WEIGHT: 231 LBS

## 2022-04-11 DIAGNOSIS — K92.0 HEMATEMESIS: ICD-10-CM

## 2022-04-11 DIAGNOSIS — R11.0 NAUSEA: ICD-10-CM

## 2022-04-11 PROCEDURE — 99214 OFFICE O/P EST MOD 30 MIN: CPT | Performed by: NURSE PRACTITIONER

## 2022-04-11 RX ORDER — PANTOPRAZOLE SODIUM 40 MG/1
40 TABLET, DELAYED RELEASE ORAL
Qty: 90 | Refills: 3 | Status: ACTIVE
Start: 2022-04-11

## 2022-04-11 RX ORDER — ONDANSETRON HYDROCHLORIDE 4 MG/1
24 TABLET, FILM COATED ORAL
Qty: 45 | Refills: 5 | Status: ACTIVE
Start: 2022-04-11

## 2022-04-11 RX ORDER — PROMETHAZINE HYDROCHLORIDE 25 MG/1
TABLET ORAL
Qty: 30 | Refills: 6 | Status: ACTIVE
Start: 2022-04-11

## 2022-04-19 ENCOUNTER — HOSPITAL ENCOUNTER (EMERGENCY)
Facility: HOSPITAL | Age: 32
Discharge: HOME | End: 2022-04-19
Attending: EMERGENCY MEDICINE | Admitting: EMERGENCY MEDICINE

## 2022-04-19 VITALS
RESPIRATION RATE: 16 BRPM | TEMPERATURE: 99.7 F | BODY MASS INDEX: 37.06 KG/M2 | OXYGEN SATURATION: 94 % | HEART RATE: 121 BPM | SYSTOLIC BLOOD PRESSURE: 131 MMHG | HEIGHT: 66 IN | WEIGHT: 230.6 LBS | DIASTOLIC BLOOD PRESSURE: 82 MMHG

## 2022-04-19 DIAGNOSIS — J10.1 INFLUENZA A: ICD-10-CM

## 2022-04-19 DIAGNOSIS — R50.9 FEVER IN ADULT: Primary | ICD-10-CM

## 2022-04-19 LAB
ALBUMIN SERPL-MCNC: 4.6 G/DL (ref 3.5–5.2)
ALBUMIN/GLOB SERPL: 1.4 G/DL
ALP SERPL-CCNC: 77 U/L (ref 39–117)
ALT SERPL W P-5'-P-CCNC: 33 U/L (ref 1–33)
ANION GAP SERPL CALCULATED.3IONS-SCNC: 14 MMOL/L (ref 5–15)
AST SERPL-CCNC: 29 U/L (ref 1–32)
B PARAPERT DNA SPEC QL NAA+PROBE: NOT DETECTED
B PERT DNA SPEC QL NAA+PROBE: NOT DETECTED
BACTERIA UR QL AUTO: ABNORMAL /HPF
BASOPHILS # BLD AUTO: 0.1 10*3/MM3 (ref 0–0.2)
BASOPHILS NFR BLD AUTO: 1.2 % (ref 0–1.5)
BILIRUB SERPL-MCNC: 0.5 MG/DL (ref 0–1.2)
BILIRUB UR QL STRIP: NEGATIVE
BUN SERPL-MCNC: 6 MG/DL (ref 6–20)
BUN/CREAT SERPL: 11.5 (ref 7–25)
C PNEUM DNA NPH QL NAA+NON-PROBE: NOT DETECTED
CALCIUM SPEC-SCNC: 9.1 MG/DL (ref 8.6–10.5)
CHLORIDE SERPL-SCNC: 97 MMOL/L (ref 98–107)
CLARITY UR: CLEAR
CO2 SERPL-SCNC: 24 MMOL/L (ref 22–29)
COLOR UR: YELLOW
CREAT SERPL-MCNC: 0.52 MG/DL (ref 0.57–1)
DEPRECATED RDW RBC AUTO: 41.6 FL (ref 37–54)
EGFRCR SERPLBLD CKD-EPI 2021: 126.8 ML/MIN/1.73
EOSINOPHIL # BLD AUTO: 0.2 10*3/MM3 (ref 0–0.4)
EOSINOPHIL NFR BLD AUTO: 2.2 % (ref 0.3–6.2)
ERYTHROCYTE [DISTWIDTH] IN BLOOD BY AUTOMATED COUNT: 14 % (ref 12.3–15.4)
FLUAV H3 RNA NPH QL NAA+PROBE: DETECTED
FLUBV RNA ISLT QL NAA+PROBE: NOT DETECTED
GLOBULIN UR ELPH-MCNC: 3.3 GM/DL
GLUCOSE SERPL-MCNC: 210 MG/DL (ref 65–99)
GLUCOSE UR STRIP-MCNC: ABNORMAL MG/DL
HADV DNA SPEC NAA+PROBE: NOT DETECTED
HCOV 229E RNA SPEC QL NAA+PROBE: NOT DETECTED
HCOV HKU1 RNA SPEC QL NAA+PROBE: NOT DETECTED
HCOV NL63 RNA SPEC QL NAA+PROBE: NOT DETECTED
HCOV OC43 RNA SPEC QL NAA+PROBE: NOT DETECTED
HCT VFR BLD AUTO: 36.7 % (ref 34–46.6)
HETEROPH AB SER QL LA: NEGATIVE
HGB BLD-MCNC: 12.7 G/DL (ref 12–15.9)
HGB UR QL STRIP.AUTO: NEGATIVE
HMPV RNA NPH QL NAA+NON-PROBE: NOT DETECTED
HPIV1 RNA ISLT QL NAA+PROBE: NOT DETECTED
HPIV2 RNA SPEC QL NAA+PROBE: NOT DETECTED
HPIV3 RNA NPH QL NAA+PROBE: NOT DETECTED
HPIV4 P GENE NPH QL NAA+PROBE: NOT DETECTED
HYALINE CASTS UR QL AUTO: ABNORMAL /LPF
KETONES UR QL STRIP: ABNORMAL
LEUKOCYTE ESTERASE UR QL STRIP.AUTO: NEGATIVE
LYMPHOCYTES # BLD AUTO: 1.1 10*3/MM3 (ref 0.7–3.1)
LYMPHOCYTES NFR BLD AUTO: 13.9 % (ref 19.6–45.3)
M PNEUMO IGG SER IA-ACNC: NOT DETECTED
MCH RBC QN AUTO: 29 PG (ref 26.6–33)
MCHC RBC AUTO-ENTMCNC: 34.8 G/DL (ref 31.5–35.7)
MCV RBC AUTO: 83.3 FL (ref 79–97)
MONOCYTES # BLD AUTO: 0.8 10*3/MM3 (ref 0.1–0.9)
MONOCYTES NFR BLD AUTO: 9.7 % (ref 5–12)
NEUTROPHILS NFR BLD AUTO: 5.7 10*3/MM3 (ref 1.7–7)
NEUTROPHILS NFR BLD AUTO: 73 % (ref 42.7–76)
NITRITE UR QL STRIP: NEGATIVE
NRBC BLD AUTO-RTO: 0.1 /100 WBC (ref 0–0.2)
PH UR STRIP.AUTO: 6.5 [PH] (ref 5–8)
PLATELET # BLD AUTO: 316 10*3/MM3 (ref 140–450)
PMV BLD AUTO: 7.7 FL (ref 6–12)
POTASSIUM SERPL-SCNC: 3.9 MMOL/L (ref 3.5–5.2)
PROCALCITONIN SERPL-MCNC: 0.07 NG/ML (ref 0–0.25)
PROT SERPL-MCNC: 7.9 G/DL (ref 6–8.5)
PROT UR QL STRIP: ABNORMAL
RBC # BLD AUTO: 4.4 10*6/MM3 (ref 3.77–5.28)
RBC # UR STRIP: ABNORMAL /HPF
REF LAB TEST METHOD: ABNORMAL
RHINOVIRUS RNA SPEC NAA+PROBE: NOT DETECTED
RSV RNA NPH QL NAA+NON-PROBE: NOT DETECTED
SARS-COV-2 RNA NPH QL NAA+NON-PROBE: NOT DETECTED
SODIUM SERPL-SCNC: 135 MMOL/L (ref 136–145)
SP GR UR STRIP: 1.02 (ref 1–1.03)
SQUAMOUS #/AREA URNS HPF: ABNORMAL /HPF
UROBILINOGEN UR QL STRIP: ABNORMAL
WBC # UR STRIP: ABNORMAL /HPF
WBC NRBC COR # BLD: 7.9 10*3/MM3 (ref 3.4–10.8)
WHOLE BLOOD HOLD SPECIMEN: NORMAL

## 2022-04-19 PROCEDURE — 84145 PROCALCITONIN (PCT): CPT | Performed by: NURSE PRACTITIONER

## 2022-04-19 PROCEDURE — 0202U NFCT DS 22 TRGT SARS-COV-2: CPT | Performed by: NURSE PRACTITIONER

## 2022-04-19 PROCEDURE — 87040 BLOOD CULTURE FOR BACTERIA: CPT | Performed by: NURSE PRACTITIONER

## 2022-04-19 PROCEDURE — 81001 URINALYSIS AUTO W/SCOPE: CPT | Performed by: NURSE PRACTITIONER

## 2022-04-19 PROCEDURE — 99283 EMERGENCY DEPT VISIT LOW MDM: CPT

## 2022-04-19 PROCEDURE — 36415 COLL VENOUS BLD VENIPUNCTURE: CPT

## 2022-04-19 PROCEDURE — 85025 COMPLETE CBC W/AUTO DIFF WBC: CPT | Performed by: NURSE PRACTITIONER

## 2022-04-19 PROCEDURE — 86308 HETEROPHILE ANTIBODY SCREEN: CPT | Performed by: NURSE PRACTITIONER

## 2022-04-19 PROCEDURE — 80053 COMPREHEN METABOLIC PANEL: CPT | Performed by: NURSE PRACTITIONER

## 2022-04-19 RX ORDER — SODIUM CHLORIDE 0.9 % (FLUSH) 0.9 %
10 SYRINGE (ML) INJECTION AS NEEDED
Status: DISCONTINUED | OUTPATIENT
Start: 2022-04-19 | End: 2022-04-19 | Stop reason: HOSPADM

## 2022-04-19 RX ORDER — ONDANSETRON 4 MG/1
4 TABLET, ORALLY DISINTEGRATING ORAL EVERY 8 HOURS PRN
Qty: 8 TABLET | Refills: 0 | Status: SHIPPED | OUTPATIENT
Start: 2022-04-19 | End: 2023-04-05

## 2022-04-19 RX ORDER — OSELTAMIVIR PHOSPHATE 75 MG/1
75 CAPSULE ORAL 2 TIMES DAILY
Qty: 10 CAPSULE | Refills: 0 | Status: SHIPPED | OUTPATIENT
Start: 2022-04-19 | End: 2022-04-24

## 2022-04-19 RX ORDER — IBUPROFEN 600 MG/1
600 TABLET ORAL ONCE
Status: COMPLETED | OUTPATIENT
Start: 2022-04-19 | End: 2022-04-19

## 2022-04-19 RX ORDER — ACETAMINOPHEN 500 MG
1000 TABLET ORAL ONCE
Status: COMPLETED | OUTPATIENT
Start: 2022-04-19 | End: 2022-04-19

## 2022-04-19 RX ADMIN — SODIUM CHLORIDE 1000 ML: 9 INJECTION, SOLUTION INTRAVENOUS at 14:47

## 2022-04-19 RX ADMIN — IBUPROFEN 600 MG: 600 TABLET ORAL at 16:26

## 2022-04-19 RX ADMIN — ACETAMINOPHEN 1000 MG: 500 TABLET, FILM COATED ORAL at 14:47

## 2022-04-19 NOTE — ED PROVIDER NOTES
Subjective   Patient is a 32-year-old obese white female with history of diabetes hypertension tachycardia who presents today with complaints of fever chills cough congestion nausea and low back pain.  She complains of body aches.  She reports her symptoms started yesterday.  She states she seen in urgent care center for the symptoms and was told that her glucose was high as she had ketones in her urine and she was instructed to come to the ED for evaluation.  She states she did have a negative strep test at that time as well.  Patient denies any chest pain or shortness of breath.  She denies vomiting or diarrhea.  She denies abdominal pain or other urinary complaint.          Review of Systems   Constitutional: Positive for chills and fever.   HENT: Positive for congestion and sore throat.    Eyes: Negative for visual disturbance.   Respiratory: Positive for cough. Negative for shortness of breath.    Cardiovascular: Negative for chest pain.   Gastrointestinal: Positive for nausea. Negative for abdominal pain, blood in stool, diarrhea and vomiting.   Genitourinary: Positive for flank pain. Negative for decreased urine volume, difficulty urinating, dysuria, frequency and urgency.   Musculoskeletal: Positive for back pain. Negative for neck pain and neck stiffness.   Skin: Negative for rash.   Neurological: Positive for weakness and headaches. Negative for dizziness, syncope, speech difficulty, light-headedness and numbness.       Past Medical History:   Diagnosis Date   • Diabetes mellitus (HCC) 8/1/2018    Taking metformin and lantus   • Headache    • Hyperlipidemia    • Hypertension    • PCOS (polycystic ovarian syndrome)    • Sleep apnea    • Uterine cancer (HCC)    • Visual impairment        Allergies   Allergen Reactions   • Lisinopril Cough   • Sulfa Antibiotics Hives   • Amoxicillin Rash   • Penicillins Rash       Past Surgical History:   Procedure Laterality Date   • COLONOSCOPY     • HYSTERECTOMY          Family History   Problem Relation Age of Onset   • Colon cancer Mother    • Uterine cancer Mother    • Cancer Mother         pancritic       Social History     Socioeconomic History   • Marital status: Single   Tobacco Use   • Smoking status: Never Smoker   • Smokeless tobacco: Never Used   Vaping Use   • Vaping Use: Never used   Substance and Sexual Activity   • Alcohol use: Not Currently     Alcohol/week: 1.0 standard drink     Types: 1 Glasses of wine per week     Comment: It’s probably once a month or less   • Drug use: No   • Sexual activity: Yes     Partners: Male     Birth control/protection: Post-menopausal, Surgical           Objective   Physical Exam  Vital signs and triage nurse note reviewed.  Constitutional: Awake, alert; well-developed and well-nourished. No acute distress is noted.  HEENT: Normocephalic, atraumatic; pupils are PERRL with intact EOM; oropharynx is pink and moist without exudate or erythema.  No drooling or pooling of oral secretions.  Neck: Supple, full range of motion without pain; no cervical lymphadenopathy. Normal phonation.  Cardiovascular: Tachycardic rate and rhythm, normal S1-S2.  No murmur noted.  Pulmonary: Respiratory effort regular nonlabored, breath sounds clear to auscultation all fields.  Abdomen: Soft, nontender, nondistended with normoactive bowel sounds; no rebound or guarding.  Musculoskeletal: Independent range of motion of all extremities with no palpable tenderness or edema.  Neuro: Alert oriented x3, speech is clear and appropriate, GCS 15.    Skin: Flesh tone, warm, dry, intact; no erythematous or petechial rash or lesion.      Procedures           ED Course      Labs Reviewed   RESPIRATORY PANEL PCR W/ COVID-19 (SARS-COV-2) DHEERAJ/JEROME/ELADIA/[facility]SE, NP SWAB IN UTM/VTP, 3-4 HR TAT - Abnormal; Notable for the following components:       Result Value    Influenza A H3 Detected (*)     All other components within normal limits    Narrative:     In  the setting of a positive respiratory panel with a viral infection PLUS a negative procalcitonin without other underlying concern for bacterial infection, consider observing off antibiotics or discontinuation of antibiotics and continue supportive care. If the respiratory panel is positive for atypical bacterial infection (Bordetella pertussis, Chlamydophila pneumoniae, or Mycoplasma pneumoniae), consider antibiotic de-escalation to target atypical bacterial infection.   COMPREHENSIVE METABOLIC PANEL - Abnormal; Notable for the following components:    Glucose 210 (*)     Creatinine 0.52 (*)     Sodium 135 (*)     Chloride 97 (*)     All other components within normal limits    Narrative:     GFR Normal >60  Chronic Kidney Disease <60  Kidney Failure <15     URINALYSIS W/ CULTURE IF INDICATED - Abnormal; Notable for the following components:    Glucose,  mg/dL (1+) (*)     Ketones, UA Trace (*)     Protein,  mg/dL (2+) (*)     All other components within normal limits   CBC WITH AUTO DIFFERENTIAL - Abnormal; Notable for the following components:    Lymphocyte % 13.9 (*)     All other components within normal limits   URINALYSIS, MICROSCOPIC ONLY - Abnormal; Notable for the following components:    RBC, UA 0-2 (*)     WBC, UA 0-2 (*)     All other components within normal limits   BLOOD CULTURE - Normal   BLOOD CULTURE - Normal   PROCALCITONIN - Normal    Narrative:     As a Marker for Sepsis (Non-Neonates):    1. <0.5 ng/mL represents a low risk of severe sepsis and/or septic shock.  2. >2 ng/mL represents a high risk of severe sepsis and/or septic shock.    As a Marker for Lower Respiratory Tract Infections that require antibiotic therapy:    PCT on Admission    Antibiotic Therapy       6-12 Hrs later    >0.5                Strongly Recommended  >0.25 - <0.5        Recommended   0.1 - 0.25          Discouraged              Remeasure/reassess PCT  <0.1                Strongly Discouraged      "Remeasure/reassess PCT    As 28 day mortality risk marker: \"Change in Procalcitonin Result\" (>80% or <=80%) if Day 0 (or Day 1) and Day 4 values are available. Refer to http://www.[facility]-pct-calculator.com    Change in PCT <=80%  A decrease of PCT levels below or equal to 80% defines a positive change in PCT test result representing a higher risk for 28-day all-cause mortality of patients diagnosed with severe sepsis for septic shock.    Change in PCT >80%  A decrease of PCT levels of more than 80% defines a negative change in PCT result representing a lower risk for 28-day all-cause mortality of patients diagnosed with severe sepsis or septic shock.      MONONUCLEOSIS SCREEN - Normal   CBC AND DIFFERENTIAL    Narrative:     The following orders were created for panel order CBC & Differential.  Procedure                               Abnormality         Status                     ---------                               -----------         ------                     CBC Auto Differential[[id number]]        Abnormal            Final result                 Please view results for these tests on the individual orders.   EXTRA TUBES    Narrative:     The following orders were created for panel order Extra Tubes.  Procedure                               Abnormality         Status                     ---------                               -----------         ------                     Gold Top - SST[[id number]]                                   Final result               Light Blue Top[[id number]]                                   Final result                 Please view results for these tests on the individual orders.   GOLD TOP - SST   LIGHT BLUE TOP     No radiology results for the last day  Medications   sodium chloride 0.9 % bolus 1,000 mL (0 mL Intravenous Stopped 4/19/22 1627)   acetaminophen (TYLENOL) tablet 1,000 mg (1,000 mg Oral Given 4/19/22 1447)   ibuprofen (ADVIL,MOTRIN) tablet 600 mg (600 mg Oral Given " 4/19/22 0056)                                                MDM  Number of Diagnoses or Management Options  Fever in adult  Influenza A  Diagnosis management comments: Comorbidities: Diabetes, hypertension  Differentials: COVID, influenza, viral respiratory infection, pneumonia, dehydration, electrolyte disturbance;this list is not all inclusive and does not constitute the entirety of considered causes  Discussion with provider:  Radiology interpretation: X-rays reviewed by me and interpreted by radiologist: As above  Lab interpretation: Labs viewed by me significant for: As above    Patient is precipitins cardiac monitor.  She had IV established.  She had labs and chest x-ray obtained.  She was given 1 L normal saline fluid bolus.  She was given Tylenol for fever.    Work-up: CBC is unremarkable with normal white blood cell count.  Procalcitonin within normal limits.  Metabolic panel significant for glucose of 210, no signs of acidosis, otherwise unremarkable.  Monospot negative.  Urinalysis significant for 250 glucose, trace ketones.  Viral respiratory panel negative for COVID but positive for influenza A.  Chest x-ray shows no acute abnormality.    On reexamination patient is resting comfortably and in no distress.  She has no new complaints.  She remains mildly tachycardic.  She is given ibuprofen.  Chart review reveals patient has history of mild tachycardia normally runs low 100s.  She is otherwise hemodynamically stable.  She is ambulatory without difficulty.  She is not hypoxic.  She is well-appearing.    Work-up today is consistent with influenza A infection.  No signs or evidence of pneumonia.  She has some mild hyperglycemia but no signs of acidosis.  She is tolerating p.o. fluids.    Diagnosis and treatment plan discussed with patient.  Patient agreeable to plan.   I discussed findings with patient who voices understanding of discharge instructions, signs and symptoms requiring return to ED; discharged  improved and in stable condition with follow up for re-evaluation.  Prescription for Zofran.       Amount and/or Complexity of Data Reviewed  Clinical lab tests: reviewed and ordered  Tests in the radiology section of CPT®: ordered and reviewed    Patient Progress  Patient progress: stable      Final diagnoses:   Fever in adult   Influenza A       ED Disposition  ED Disposition     ED Disposition   Discharge    Condition   Stable    Comment   --             Hilda Acosta, APRN  [address] [facility]  [address]  [place] [address] [phone]      As needed         Medication List      New Prescriptions    ondansetron ODT 4 MG disintegrating tablet  Commonly known as: Zofran ODT  Place 1 tablet on the tongue Every 8 (Eight) Hours As Needed for Nausea.     oseltamivir 75 MG capsule  Commonly known as: TAMIFLU  Take 1 capsule by mouth 2 (Two) Times a Day for 5 days.           Where to Get Your Medications      These medications were sent to [id number] - [place], [place] - [address] AT [address] - [phone] [facility] [phone] [place] [address] [place] [address]    Phone: [phone]   · ondansetron ODT 4 MG disintegrating tablet  · oseltamivir 75 MG capsule          Shilpa Garcia, JACKIE  04/21/22 2967

## 2022-04-19 NOTE — ED NOTES
PT REPORTS LOW BACK PAIN, SORE THROAT AND CONGESTION SINCE YESTERDAY. PT SEEN AT [facility] FOR THIS AND WAS TOLD TO COME HERE FOR FURTHER EVAL OF HIGH [id number]

## 2022-04-24 LAB
BACTERIA SPEC AEROBE CULT: NORMAL
BACTERIA SPEC AEROBE CULT: NORMAL

## 2022-05-25 DIAGNOSIS — I10 HYPERTENSION, UNSPECIFIED TYPE: ICD-10-CM

## 2022-05-25 RX ORDER — AMLODIPINE BESYLATE 10 MG/1
10 TABLET ORAL DAILY
Qty: 90 TABLET | Refills: 0 | Status: SHIPPED | OUTPATIENT
Start: 2022-05-25 | End: 2023-01-19

## 2022-06-03 DIAGNOSIS — F33.1 MODERATE EPISODE OF RECURRENT MAJOR DEPRESSIVE DISORDER: ICD-10-CM

## 2022-06-03 RX ORDER — BUPROPION HYDROCHLORIDE 300 MG/1
300 TABLET ORAL DAILY
Qty: 90 TABLET | Refills: 3 | Status: SHIPPED | OUTPATIENT
Start: 2022-06-03 | End: 2023-04-05

## 2022-06-03 RX ORDER — SERTRALINE HYDROCHLORIDE 100 MG/1
100 TABLET, FILM COATED ORAL DAILY
Qty: 90 TABLET | Refills: 3 | Status: SHIPPED | OUTPATIENT
Start: 2022-06-03 | End: 2023-04-05

## 2022-06-24 DIAGNOSIS — I10 HYPERTENSION, UNSPECIFIED TYPE: ICD-10-CM

## 2022-06-24 RX ORDER — HYDRALAZINE HYDROCHLORIDE 25 MG/1
TABLET, FILM COATED ORAL
Qty: 180 TABLET | Refills: 0 | Status: SHIPPED | OUTPATIENT
Start: 2022-06-24 | End: 2023-04-05

## 2022-07-21 RX ORDER — METFORMIN HYDROCHLORIDE 500 MG/1
TABLET, EXTENDED RELEASE ORAL
Qty: 360 TABLET | Refills: 4 | OUTPATIENT
Start: 2022-07-21

## 2022-07-29 ENCOUNTER — TELEPHONE (OUTPATIENT)
Dept: FAMILY MEDICINE CLINIC | Facility: CLINIC | Age: 32
End: 2022-07-29

## 2022-07-29 DIAGNOSIS — E11.65 TYPE 2 DIABETES MELLITUS WITH HYPERGLYCEMIA, WITH LONG-TERM CURRENT USE OF INSULIN: ICD-10-CM

## 2022-07-29 DIAGNOSIS — Z79.4 TYPE 2 DIABETES MELLITUS WITH HYPERGLYCEMIA, WITH LONG-TERM CURRENT USE OF INSULIN: ICD-10-CM

## 2022-08-14 DIAGNOSIS — E11.65 TYPE 2 DIABETES MELLITUS WITH HYPERGLYCEMIA, WITH LONG-TERM CURRENT USE OF INSULIN: ICD-10-CM

## 2022-08-14 DIAGNOSIS — Z79.4 TYPE 2 DIABETES MELLITUS WITH HYPERGLYCEMIA, WITH LONG-TERM CURRENT USE OF INSULIN: ICD-10-CM

## 2022-08-14 NOTE — PROGRESS NOTES
Patient called on-call service requesting refill of CGM sensor.  Refill sent to patient's pharmacy.

## 2022-08-17 ENCOUNTER — TELEPHONE (OUTPATIENT)
Dept: FAMILY MEDICINE CLINIC | Facility: CLINIC | Age: 32
End: 2022-08-17

## 2022-08-17 NOTE — TELEPHONE ENCOUNTER
Caller: Yuko Lopez    Relationship: Self    Best call back number:[phone]     What is the best time to reach you: ANY    Who are you requesting to speak with (clinical staff, provider,  specific staff member): CLINICAL    Do you know the name of the person who called: NONE    What was the call regarding: PLEASE SEE HER MY CHART MESSAGE REGARDING A LETTER FOR COLLEGE THAT SHE NEEDS BEFORE Friday.      Do you require a callback: YES

## 2022-09-29 ENCOUNTER — OFFICE VISIT (OUTPATIENT)
Dept: FAMILY MEDICINE CLINIC | Facility: CLINIC | Age: 32
End: 2022-09-29

## 2022-09-29 VITALS
WEIGHT: 231 LBS | HEART RATE: 95 BPM | DIASTOLIC BLOOD PRESSURE: 92 MMHG | OXYGEN SATURATION: 99 % | SYSTOLIC BLOOD PRESSURE: 143 MMHG | BODY MASS INDEX: 37.28 KG/M2 | TEMPERATURE: 98.2 F

## 2022-09-29 DIAGNOSIS — E78.5 HYPERLIPIDEMIA, UNSPECIFIED HYPERLIPIDEMIA TYPE: ICD-10-CM

## 2022-09-29 DIAGNOSIS — Z23 NEED FOR IMMUNIZATION AGAINST INFLUENZA: ICD-10-CM

## 2022-09-29 DIAGNOSIS — F33.1 MODERATE EPISODE OF RECURRENT MAJOR DEPRESSIVE DISORDER: ICD-10-CM

## 2022-09-29 DIAGNOSIS — I10 HYPERTENSION, UNSPECIFIED TYPE: ICD-10-CM

## 2022-09-29 DIAGNOSIS — Z23 NEED FOR PNEUMOCOCCAL VACCINATION: ICD-10-CM

## 2022-09-29 DIAGNOSIS — E11.69 TYPE 2 DIABETES MELLITUS WITH OTHER SPECIFIED COMPLICATION, WITHOUT LONG-TERM CURRENT USE OF INSULIN: Primary | ICD-10-CM

## 2022-09-29 DIAGNOSIS — R79.89 LOW VITAMIN D LEVEL: ICD-10-CM

## 2022-09-29 PROCEDURE — 90471 IMMUNIZATION ADMIN: CPT | Performed by: NURSE PRACTITIONER

## 2022-09-29 PROCEDURE — 90472 IMMUNIZATION ADMIN EACH ADD: CPT | Performed by: NURSE PRACTITIONER

## 2022-09-29 PROCEDURE — 99214 OFFICE O/P EST MOD 30 MIN: CPT | Performed by: NURSE PRACTITIONER

## 2022-09-29 PROCEDURE — 90677 PCV20 VACCINE IM: CPT | Performed by: NURSE PRACTITIONER

## 2022-09-29 PROCEDURE — 90686 IIV4 VACC NO PRSV 0.5 ML IM: CPT | Performed by: NURSE PRACTITIONER

## 2022-09-29 RX ORDER — ERGOCALCIFEROL 1.25 MG/1
50000 CAPSULE ORAL WEEKLY
Qty: 12 CAPSULE | Refills: 0 | Status: SHIPPED | OUTPATIENT
Start: 2022-09-29 | End: 2022-12-16

## 2022-09-29 RX ORDER — DULAGLUTIDE 1.5 MG/.5ML
INJECTION, SOLUTION SUBCUTANEOUS WEEKLY
COMMUNITY
Start: 2022-09-22 | End: 2023-04-05

## 2022-09-29 NOTE — PROGRESS NOTES
Subjective     Yuko Lopez is a 32 y.o. female.     History of Present Illness  Patient is here today for a follow-up on hypertension, hyperlipidemia, diabetes, and depression/anxiety  She is in school for business  Reviewed labs from endocrinology     Hypertension-patient is currently on Norvasc 10 mg daily, losartan-hydrochlorothiazide 100-25 mg daily, and hydralazine 25mg bid. She has been monitoring BP at home and it is running 130/80s. She has been to cardiology.  Denies CP, SOA, dizziness, HA.      Hyperlipidemia-patient is currently on Lipitor 40 mg daily. She is trying to eat a well balanced diet.     Diabetes-patient sees endocrinology for this.  She is currently on metformin XR 1000 mg twice daily, trulicty 1.5 mg weekly, and actos 30mg daily. Monitors BS with Dexcom and they have been running between . The trulicity has helped a lot.     Depression/anxiety-patient is currently on Wellbutrin  mg daily and Zoloft 100 mg daily.  Denies any SI or HI. Has vistaril as needed. She is doing ok on the medication.      Labs- UTD  Pap smear- had hysterectomy- due will schedule with [facility]  Colonoscopy- 8/30/21    Vaccines:  Flu- due  PNA- due  Tdap-  Covid-19-    Dental exam-  Eye exam-            The following portions of the patient's history were reviewed and updated as appropriate: allergies, current medications, past family history, past medical history, past social history, past surgical history and problem list.    Review of Systems   Constitutional: Negative for chills, fatigue and fever.   Eyes: Negative for blurred vision and double vision.   Respiratory: Negative for chest tightness and shortness of breath.    Cardiovascular: Negative for chest pain and palpitations.   Gastrointestinal: Positive for abdominal pain, diarrhea and nausea. Negative for constipation and vomiting.   Genitourinary: Negative for dysuria and urgency.   Neurological: Negative for dizziness and headache.    Psychiatric/Behavioral: Negative for self-injury, suicidal ideas, depressed mood and stress. The patient is not nervous/anxious.        Objective     /92 (BP Location: Left arm, Patient Position: Sitting, Cuff Size: Large Adult)   Pulse 95   Temp 98.2 °F (36.8 °C) (Tympanic)   Wt 105 kg (231 lb)   SpO2 99%   BMI 37.28 kg/m²     Current Outpatient Medications on File Prior to Visit   Medication Sig Dispense Refill   • Continuous Blood Gluc Sensor (FreeStyle Dandy 2 Sensor) misc      • Trulicity 1.5 MG/0.5ML solution pen-injector 1 (One) Time Per Week.     • amLODIPine (NORVASC) 10 MG tablet TAKE 1 TABLET BY MOUTH DAILY 90 tablet 0   • atorvastatin (LIPITOR) 40 MG tablet TAKE 1 TABLET BY MOUTH DAILY 90 tablet 1   • buPROPion XL (Wellbutrin XL) 300 MG 24 hr tablet Take 1 tablet by mouth Daily. 90 tablet 3   • Calcium 150 MG tablet 600/400mg  11   • hydrALAZINE (APRESOLINE) 25 MG tablet TAKE 1 TABLET BY MOUTH TWICE DAILY 180 tablet 0   • hydrOXYzine pamoate (VISTARIL) 25 MG capsule TAKE 1 CAPSULE BY MOUTH THREE TIMES DAILY AS NEEDED FOR ANXIETY 30 capsule 0   • losartan-hydrochlorothiazide (Hyzaar) 100-25 MG per tablet Take 1 tablet by mouth Daily. 90 tablet 1   • metFORMIN ER (GLUCOPHAGE-XR) 500 MG 24 hr tablet 2 tablets twice a day. 360 tablet 4   • ondansetron ODT (Zofran ODT) 4 MG disintegrating tablet Place 1 tablet on the tongue Every 8 (Eight) Hours As Needed for Nausea. 8 tablet 0   • pioglitazone (ACTOS) 30 MG tablet      • sertraline (ZOLOFT) 100 MG tablet Take 1 tablet by mouth Daily. 90 tablet 3   • [DISCONTINUED] ACCU-CHEK VALENTINE PLUS test strip 2 (Two) Times a Day. for testing  3   • [DISCONTINUED] ACCU-CHEK SOFTCLIX LANCETS lancets TEST BS BID  3   • [DISCONTINUED] B-D UF III MINI PEN NEEDLES 31G X 5 MM misc USE ONCE DAILY AS DIRECTED 100 each 0   • [DISCONTINUED] Blood Glucose Monitoring Suppl (ACCU-CHEK VALENTINE PLUS) w/Device kit TEST BLOOD SUGAR BID  0   • [DISCONTINUED] Tresiba FlexTouch 200  UNIT/ML solution pen-injector pen injection ADMINISTER 60 UNITS UNDER THE SKIN EVERY MORNING     • [DISCONTINUED] Trulicity 0.75 MG/0.5ML solution pen-injector ADMINISTER 0.75 MG UNDER THE SKIN 1 TIME A WEEK       No current facility-administered medications on file prior to visit.        Physical Exam  Vitals reviewed.   Constitutional:       General: She is not in acute distress.     Appearance: Normal appearance. She is well-developed. She is not diaphoretic.   HENT:      Head: Normocephalic and atraumatic.   Eyes:      General:         Right eye: No discharge.         Left eye: No discharge.      Extraocular Movements: Extraocular movements intact.      Conjunctiva/sclera: Conjunctivae normal.   Cardiovascular:      Rate and Rhythm: Normal rate and regular rhythm.   Pulmonary:      Effort: Pulmonary effort is normal. No respiratory distress.      Breath sounds: Normal breath sounds. No wheezing or rales.   Abdominal:      General: Bowel sounds are normal.      Palpations: Abdomen is soft.   Musculoskeletal:         General: Normal range of motion.      Cervical back: Normal range of motion.   Skin:     General: Skin is warm and dry.   Neurological:      General: No focal deficit present.      Mental Status: She is alert and oriented to person, place, and time.   Psychiatric:         Mood and Affect: Mood normal.         Behavior: Behavior normal.         Thought Content: Thought content normal.         Judgment: Judgment normal.           Assessment & Plan     Diagnoses and all orders for this visit:    1. Type 2 diabetes mellitus with other specified complication, without long-term current use of insulin (HCC) (Primary)  Comments:  Improved  Sees Endo  A1c down to 7.9  Reviewed labs from Endo  Continue meds    2. Moderate episode of recurrent major depressive disorder (HCC)  Comments:  Stable  Continue meds  Denies SI or HI    3. Low vitamin D level  Comments:  Treat with 50,000 units weekly for 12 weeks then  2000 daily  Orders:  -     ergocalciferol (Drisdol) 1.25 MG (79761 UT) capsule; Take 1 capsule by mouth 1 (One) Time Per Week for 12 doses.  Dispense: 12 capsule; Refill: 0    4. Need for pneumococcal vaccination  -     Pneumococcal Conjugate Vaccine 20-Valent (PCV20)    5. Need for immunization against influenza  -     FluLaval/Fluzone >6 mos ([id number])    6. Hypertension, unspecified type  Comments:  Stable  Reviewed previous blood work  Work on diet and exercise    7. Hyperlipidemia, unspecified hyperlipidemia type  Comments:  Work on diet and exercise  Viewed labs  Continue Lipitor

## 2022-12-24 DIAGNOSIS — E78.5 HYPERLIPIDEMIA, UNSPECIFIED HYPERLIPIDEMIA TYPE: ICD-10-CM

## 2022-12-27 RX ORDER — ATORVASTATIN CALCIUM 40 MG/1
40 TABLET, FILM COATED ORAL DAILY
Qty: 90 TABLET | Refills: 1 | Status: SHIPPED | OUTPATIENT
Start: 2022-12-27 | End: 2023-04-05

## 2023-01-02 DIAGNOSIS — R79.89 LOW VITAMIN D LEVEL: ICD-10-CM

## 2023-01-03 RX ORDER — ERGOCALCIFEROL 1.25 MG/1
CAPSULE ORAL
Qty: 12 CAPSULE | Refills: 0 | OUTPATIENT
Start: 2023-01-03

## 2023-01-19 DIAGNOSIS — I10 HYPERTENSION, UNSPECIFIED TYPE: ICD-10-CM

## 2023-01-19 RX ORDER — AMLODIPINE BESYLATE 10 MG/1
10 TABLET ORAL DAILY
Qty: 90 TABLET | Refills: 0 | Status: SHIPPED | OUTPATIENT
Start: 2023-01-19 | End: 2023-04-05

## 2023-04-05 ENCOUNTER — OFFICE VISIT (OUTPATIENT)
Dept: FAMILY MEDICINE CLINIC | Facility: CLINIC | Age: 33
End: 2023-04-05
Payer: COMMERCIAL

## 2023-04-05 VITALS
OXYGEN SATURATION: 98 % | TEMPERATURE: 98.2 F | SYSTOLIC BLOOD PRESSURE: 168 MMHG | HEART RATE: 95 BPM | BODY MASS INDEX: 38.09 KG/M2 | WEIGHT: 236 LBS | DIASTOLIC BLOOD PRESSURE: 118 MMHG

## 2023-04-05 DIAGNOSIS — E78.5 HYPERLIPIDEMIA, UNSPECIFIED HYPERLIPIDEMIA TYPE: ICD-10-CM

## 2023-04-05 DIAGNOSIS — Z00.00 PREVENTATIVE HEALTH CARE: ICD-10-CM

## 2023-04-05 DIAGNOSIS — F33.1 MODERATE EPISODE OF RECURRENT MAJOR DEPRESSIVE DISORDER: ICD-10-CM

## 2023-04-05 DIAGNOSIS — I10 HYPERTENSION, UNSPECIFIED TYPE: Primary | ICD-10-CM

## 2023-04-05 DIAGNOSIS — E11.69 TYPE 2 DIABETES MELLITUS WITH OTHER SPECIFIED COMPLICATION, WITHOUT LONG-TERM CURRENT USE OF INSULIN: ICD-10-CM

## 2023-04-05 RX ORDER — ATORVASTATIN CALCIUM 10 MG/1
10 TABLET, FILM COATED ORAL DAILY
Qty: 90 TABLET | Refills: 1 | Status: SHIPPED | OUTPATIENT
Start: 2023-04-05

## 2023-04-05 RX ORDER — TIRZEPATIDE 7.5 MG/.5ML
INJECTION, SOLUTION SUBCUTANEOUS
COMMUNITY
Start: 2023-02-20

## 2023-04-05 RX ORDER — LOSARTAN POTASSIUM AND HYDROCHLOROTHIAZIDE 25; 100 MG/1; MG/1
1 TABLET ORAL DAILY
Qty: 90 TABLET | Refills: 1 | Status: SHIPPED | OUTPATIENT
Start: 2023-04-05

## 2023-04-05 NOTE — PROGRESS NOTES
Subjective     Yuko Lopez is a 32 y.o. female.     History of Present Illness  Patient is here today for a follow-up on hypertension, hyperlipidemia, diabetes, and depression/anxiety  She is exercising 3 days a week- walking and also tries a Youtube workout.      Hypertension-patient was on Norvasc 10 mg daily, losartan-hydrochlorothiazide 100-25 mg daily, and hydralazine 25mg bid but she stopped all of them in January. She has been monitoring BP at home and it is running 120 systolic. She does not remember the diastolic, but states it is under 100. She has been to cardiology.  Denies CP, SOA, dizziness, HA.      Hyperlipidemia-patient was on Lipitor 40 mg daily but stopped taking that when she started Monjaro in January. She is trying to eat a well balanced diet.  Will start a low dose statin again.     Diabetes-patient sees endocrinology for this.  She has stopped taking her metformin XR 1000 mg twice daily and actos 30mg daily., She is taking mounjaro 7.5mg weekly.  Monitors BS with [employer] continuous glucose monitor and they have been running between 150-175. She reports minimal nausea with the mounjaro. She sees endo next week.      Depression/anxiety-patient was on Wellbutrin  mg daily and Zoloft 100 mg daily but stopped completely in January.  Denies any SI or HI. She states she is having more mental clarity. She started a  New job. She would like to stay off medication.      Labs- UTD  Pap smear- had hysterectomy  Colonoscopy- 8/30/21     Vaccines:  Flu- N/A   PNA- 2022   Tdap- 5/2014   Covid-19- UTD      Dental exam- UTD  Eye exam- UTD       The following portions of the patient's history were reviewed and updated as appropriate: allergies, current medications, past family history, past medical history, past social history, past surgical history and problem list.    Review of Systems   Constitutional: Negative for chills, fatigue and fever.   Respiratory: Negative for chest tightness and shortness  of breath.    Cardiovascular: Negative for chest pain, palpitations and leg swelling.   Gastrointestinal: Positive for nausea. Negative for abdominal pain, diarrhea and vomiting.   Genitourinary: Negative for frequency.   Neurological: Negative for dizziness and headache.   Psychiatric/Behavioral: Negative for self-injury, sleep disturbance, suicidal ideas, depressed mood and stress. The patient is not nervous/anxious.        Objective     BP (!) 168/118 (BP Location: Right arm, Patient Position: Sitting, Cuff Size: Large Adult)   Pulse 95   Temp 98.2 °F (36.8 °C) (Tympanic)   Wt 107 kg (236 lb)   SpO2 98%   BMI 38.09 kg/m²     Current Outpatient Medications on File Prior to Visit   Medication Sig Dispense Refill   • Mounjaro 7.5 MG/0.5ML solution pen-injector ADMINISTER 0.5 ML UNDER THE SKIN EVERY 7 DAYS     • Continuous Blood Gluc Sensor (FreeStyle Dandy 2 Sensor) misc      • [DISCONTINUED] amLODIPine (NORVASC) 10 MG tablet TAKE 1 TABLET BY MOUTH DAILY 90 tablet 0   • [DISCONTINUED] atorvastatin (LIPITOR) 40 MG tablet TAKE 1 TABLET BY MOUTH DAILY 90 tablet 1   • [DISCONTINUED] buPROPion XL (Wellbutrin XL) 300 MG 24 hr tablet Take 1 tablet by mouth Daily. 90 tablet 3   • [DISCONTINUED] Calcium 150 MG tablet 600/400mg  11   • [DISCONTINUED] hydrALAZINE (APRESOLINE) 25 MG tablet TAKE 1 TABLET BY MOUTH TWICE DAILY 180 tablet 0   • [DISCONTINUED] hydrOXYzine pamoate (VISTARIL) 25 MG capsule TAKE 1 CAPSULE BY MOUTH THREE TIMES DAILY AS NEEDED FOR ANXIETY 30 capsule 0   • [DISCONTINUED] losartan-hydrochlorothiazide (Hyzaar) 100-25 MG per tablet Take 1 tablet by mouth Daily. 90 tablet 1   • [DISCONTINUED] metFORMIN ER (GLUCOPHAGE-XR) 500 MG 24 hr tablet 2 tablets twice a day. 360 tablet 4   • [DISCONTINUED] ondansetron ODT (Zofran ODT) 4 MG disintegrating tablet Place 1 tablet on the tongue Every 8 (Eight) Hours As Needed for Nausea. 8 tablet 0   • [DISCONTINUED] pioglitazone (ACTOS) 30 MG tablet      •  [DISCONTINUED] sertraline (ZOLOFT) 100 MG tablet Take 1 tablet by mouth Daily. 90 tablet 3   • [DISCONTINUED] Trulicity 1.5 MG/0.5ML solution pen-injector 1 (One) Time Per Week.       No current facility-administered medications on file prior to visit.        Physical Exam  Vitals reviewed.   Constitutional:       General: She is not in acute distress.     Appearance: Normal appearance. She is well-developed. She is obese. She is not diaphoretic.   HENT:      Head: Normocephalic and atraumatic.   Eyes:      General:         Right eye: No discharge.         Left eye: No discharge.      Extraocular Movements: Extraocular movements intact.      Conjunctiva/sclera: Conjunctivae normal.   Cardiovascular:      Rate and Rhythm: Normal rate and regular rhythm.   Pulmonary:      Effort: Pulmonary effort is normal. No respiratory distress.      Breath sounds: Normal breath sounds. No wheezing or rales.   Abdominal:      General: Bowel sounds are normal.      Palpations: Abdomen is soft.   Musculoskeletal:         General: Normal range of motion.      Cervical back: Normal range of motion.   Skin:     General: Skin is warm and dry.   Neurological:      General: No focal deficit present.      Mental Status: She is alert and oriented to person, place, and time.   Psychiatric:         Mood and Affect: Mood normal.         Behavior: Behavior normal.         Thought Content: Thought content normal.         Judgment: Judgment normal.           Assessment & Plan     Diagnoses and all orders for this visit:    1. Hypertension, unspecified type (Primary)  Comments:  elevated  stopped meds on own  home readings stable  restart losartan-Hctz- need for kidney protection  2 wk nurse BP check  Orders:  -     losartan-hydrochlorothiazide (Hyzaar) 100-25 MG per tablet; Take 1 tablet by mouth Daily.  Dispense: 90 tablet; Refill: 1    2. Hyperlipidemia, unspecified hyperlipidemia type  Comments:  restart low dose lipitor  work on diet and  exercise  check lipid panel  Orders:  -     Lipid Panel; Future  -     atorvastatin (Lipitor) 10 MG tablet; Take 1 tablet by mouth Daily.  Dispense: 90 tablet; Refill: 1    3. Type 2 diabetes mellitus with other specified complication, without long-term current use of insulin  Comments:  only taking mounjaro  stopped actos and metformin  check A1C  work on diet and exercise  Orders:  -     Comprehensive Metabolic Panel; Future  -     Hemoglobin A1c; Future    4. Moderate episode of recurrent major depressive disorder  Comments:  stable  off all meds  denies SI or HI    5. Preventative health care  Comments:  check labs  work on diet and exercise  Orders:  -     Comprehensive Metabolic Panel; Future  -     Lipid Panel; Future  -     Hepatitis C Antibody; Future

## 2023-04-06 ENCOUNTER — LAB (OUTPATIENT)
Dept: FAMILY MEDICINE CLINIC | Facility: CLINIC | Age: 33
End: 2023-04-06
Payer: COMMERCIAL

## 2023-04-06 DIAGNOSIS — Z00.00 PREVENTATIVE HEALTH CARE: ICD-10-CM

## 2023-04-06 DIAGNOSIS — E11.69 TYPE 2 DIABETES MELLITUS WITH OTHER SPECIFIED COMPLICATION, WITHOUT LONG-TERM CURRENT USE OF INSULIN: ICD-10-CM

## 2023-04-06 DIAGNOSIS — E78.5 HYPERLIPIDEMIA, UNSPECIFIED HYPERLIPIDEMIA TYPE: ICD-10-CM

## 2023-04-06 LAB
ALBUMIN SERPL-MCNC: 4.8 G/DL (ref 3.5–5.2)
ALBUMIN/GLOB SERPL: 1.6 G/DL
ALP SERPL-CCNC: 62 U/L (ref 39–117)
ALT SERPL W P-5'-P-CCNC: 33 U/L (ref 1–33)
ANION GAP SERPL CALCULATED.3IONS-SCNC: 11.9 MMOL/L (ref 5–15)
AST SERPL-CCNC: 23 U/L (ref 1–32)
BILIRUB SERPL-MCNC: 0.3 MG/DL (ref 0–1.2)
BUN SERPL-MCNC: 8 MG/DL (ref 6–20)
BUN/CREAT SERPL: 15.7 (ref 7–25)
CALCIUM SPEC-SCNC: 9.4 MG/DL (ref 8.6–10.5)
CHLORIDE SERPL-SCNC: 103 MMOL/L (ref 98–107)
CHOLEST SERPL-MCNC: 214 MG/DL (ref 0–200)
CO2 SERPL-SCNC: 23.1 MMOL/L (ref 22–29)
CREAT SERPL-MCNC: 0.51 MG/DL (ref 0.57–1)
EGFRCR SERPLBLD CKD-EPI 2021: 127.4 ML/MIN/1.73
GLOBULIN UR ELPH-MCNC: 3 GM/DL
GLUCOSE SERPL-MCNC: 143 MG/DL (ref 65–99)
HBA1C MFR BLD: 7.2 % (ref 4.8–5.6)
HCV AB SER DONR QL: NORMAL
HDLC SERPL-MCNC: 45 MG/DL (ref 40–60)
LDLC SERPL CALC-MCNC: 133 MG/DL (ref 0–100)
LDLC/HDLC SERPL: 2.87 {RATIO}
POTASSIUM SERPL-SCNC: 4 MMOL/L (ref 3.5–5.2)
PROT SERPL-MCNC: 7.8 G/DL (ref 6–8.5)
SODIUM SERPL-SCNC: 138 MMOL/L (ref 136–145)
TRIGL SERPL-MCNC: 200 MG/DL (ref 0–150)
VLDLC SERPL-MCNC: 36 MG/DL (ref 5–40)

## 2023-04-06 PROCEDURE — 86803 HEPATITIS C AB TEST: CPT | Performed by: NURSE PRACTITIONER

## 2023-04-06 PROCEDURE — 80061 LIPID PANEL: CPT | Performed by: NURSE PRACTITIONER

## 2023-04-06 PROCEDURE — 36415 COLL VENOUS BLD VENIPUNCTURE: CPT

## 2023-04-06 PROCEDURE — 83036 HEMOGLOBIN GLYCOSYLATED A1C: CPT | Performed by: NURSE PRACTITIONER

## 2023-04-06 PROCEDURE — 80053 COMPREHEN METABOLIC PANEL: CPT | Performed by: NURSE PRACTITIONER

## 2023-04-21 ENCOUNTER — CLINICAL SUPPORT (OUTPATIENT)
Dept: FAMILY MEDICINE CLINIC | Facility: CLINIC | Age: 33
End: 2023-04-21
Payer: COMMERCIAL

## 2023-04-21 VITALS — DIASTOLIC BLOOD PRESSURE: 103 MMHG | SYSTOLIC BLOOD PRESSURE: 153 MMHG | HEART RATE: 90 BPM | OXYGEN SATURATION: 100 %

## 2023-04-21 DIAGNOSIS — I10 ESSENTIAL HYPERTENSION: ICD-10-CM

## 2023-04-21 PROBLEM — R80.9 MICROALBUMINURIA: Status: ACTIVE | Noted: 2022-10-06

## 2023-04-21 PROBLEM — E11.65 UNCONTROLLED TYPE 2 DIABETES MELLITUS WITH HYPERGLYCEMIA: Status: ACTIVE | Noted: 2022-07-05

## 2023-04-21 RX ORDER — AMLODIPINE BESYLATE 5 MG/1
5 TABLET ORAL DAILY
Qty: 90 TABLET | Refills: 0 | Status: SHIPPED | OUTPATIENT
Start: 2023-04-21

## 2023-04-21 RX ORDER — AMLODIPINE BESYLATE 5 MG/1
5 TABLET ORAL DAILY
Qty: 30 TABLET | Refills: 0 | Status: SHIPPED | OUTPATIENT
Start: 2023-04-21 | End: 2023-04-21

## 2023-04-21 NOTE — PROGRESS NOTES
Pt is in office today for 2 week BP check  Per last visit: restart losartan-Hctz- need for kidney protection  Tolerating well. No CP, dizziness, SOA, ETC.   A little anxious today of possible reading.   At [facility] it was 150/100. At endo it was 125/75 a few days before that.   Had pt sit for 10mins, but pt had to leave due to an emergency and was unable to recheck BP.    Pts BP was quite elevated here. I will add on norvasc 5mg daily.

## 2023-10-09 ENCOUNTER — LAB (OUTPATIENT)
Dept: FAMILY MEDICINE CLINIC | Facility: CLINIC | Age: 33
End: 2023-10-09
Payer: COMMERCIAL

## 2023-10-09 ENCOUNTER — OFFICE VISIT (OUTPATIENT)
Dept: FAMILY MEDICINE CLINIC | Facility: CLINIC | Age: 33
End: 2023-10-09
Payer: COMMERCIAL

## 2023-10-09 VITALS
WEIGHT: 225 LBS | OXYGEN SATURATION: 99 % | TEMPERATURE: 98.3 F | SYSTOLIC BLOOD PRESSURE: 143 MMHG | BODY MASS INDEX: 36.32 KG/M2 | DIASTOLIC BLOOD PRESSURE: 99 MMHG | HEART RATE: 114 BPM

## 2023-10-09 DIAGNOSIS — I10 ESSENTIAL HYPERTENSION: Primary | ICD-10-CM

## 2023-10-09 DIAGNOSIS — E11.69 TYPE 2 DIABETES MELLITUS WITH OTHER SPECIFIED COMPLICATION, WITHOUT LONG-TERM CURRENT USE OF INSULIN: ICD-10-CM

## 2023-10-09 DIAGNOSIS — E78.5 HYPERLIPIDEMIA, UNSPECIFIED HYPERLIPIDEMIA TYPE: ICD-10-CM

## 2023-10-09 DIAGNOSIS — Z23 NEED FOR IMMUNIZATION AGAINST INFLUENZA: ICD-10-CM

## 2023-10-09 DIAGNOSIS — I10 ESSENTIAL HYPERTENSION: ICD-10-CM

## 2023-10-09 DIAGNOSIS — F33.1 MODERATE EPISODE OF RECURRENT MAJOR DEPRESSIVE DISORDER: ICD-10-CM

## 2023-10-09 LAB
ALBUMIN SERPL-MCNC: 4.8 G/DL (ref 3.5–5.2)
ALBUMIN UR-MCNC: 26.4 MG/DL
ALBUMIN/GLOB SERPL: 1.4 G/DL
ALP SERPL-CCNC: 66 U/L (ref 39–117)
ALT SERPL W P-5'-P-CCNC: 32 U/L (ref 1–33)
ANION GAP SERPL CALCULATED.3IONS-SCNC: 14.9 MMOL/L (ref 5–15)
AST SERPL-CCNC: 24 U/L (ref 1–32)
BILIRUB SERPL-MCNC: 0.4 MG/DL (ref 0–1.2)
BUN SERPL-MCNC: 10 MG/DL (ref 6–20)
BUN/CREAT SERPL: 16.4 (ref 7–25)
CALCIUM SPEC-SCNC: 10.5 MG/DL (ref 8.6–10.5)
CHLORIDE SERPL-SCNC: 97 MMOL/L (ref 98–107)
CHOLEST SERPL-MCNC: 181 MG/DL (ref 0–200)
CO2 SERPL-SCNC: 27.1 MMOL/L (ref 22–29)
CREAT SERPL-MCNC: 0.61 MG/DL (ref 0.57–1)
CREAT UR-MCNC: 83.2 MG/DL
EGFRCR SERPLBLD CKD-EPI 2021: 121.2 ML/MIN/1.73
GLOBULIN UR ELPH-MCNC: 3.4 GM/DL
GLUCOSE SERPL-MCNC: 211 MG/DL (ref 65–99)
HBA1C MFR BLD: 8.3 % (ref 4.8–5.6)
HDLC SERPL-MCNC: 47 MG/DL (ref 40–60)
LDLC SERPL CALC-MCNC: 100 MG/DL (ref 0–100)
LDLC/HDLC SERPL: 2 {RATIO}
MICROALBUMIN/CREAT UR: 317.3 MG/G (ref 0–29)
POTASSIUM SERPL-SCNC: 3.7 MMOL/L (ref 3.5–5.2)
PROT SERPL-MCNC: 8.2 G/DL (ref 6–8.5)
SODIUM SERPL-SCNC: 139 MMOL/L (ref 136–145)
TRIGL SERPL-MCNC: 200 MG/DL (ref 0–150)
VLDLC SERPL-MCNC: 34 MG/DL (ref 5–40)

## 2023-10-09 PROCEDURE — 80053 COMPREHEN METABOLIC PANEL: CPT | Performed by: NURSE PRACTITIONER

## 2023-10-09 PROCEDURE — 80061 LIPID PANEL: CPT | Performed by: NURSE PRACTITIONER

## 2023-10-09 PROCEDURE — 82570 ASSAY OF URINE CREATININE: CPT | Performed by: NURSE PRACTITIONER

## 2023-10-09 PROCEDURE — 82043 UR ALBUMIN QUANTITATIVE: CPT | Performed by: NURSE PRACTITIONER

## 2023-10-09 PROCEDURE — 36415 COLL VENOUS BLD VENIPUNCTURE: CPT

## 2023-10-09 PROCEDURE — 83036 HEMOGLOBIN GLYCOSYLATED A1C: CPT | Performed by: NURSE PRACTITIONER

## 2023-10-09 RX ORDER — AMLODIPINE BESYLATE 10 MG/1
10 TABLET ORAL DAILY
COMMUNITY
Start: 2023-08-15

## 2023-10-09 RX ORDER — SERTRALINE HYDROCHLORIDE 25 MG/1
25 TABLET, FILM COATED ORAL DAILY
Qty: 30 TABLET | Refills: 0 | Status: SHIPPED | OUTPATIENT
Start: 2023-10-09

## 2023-10-09 RX ORDER — HYDRALAZINE HYDROCHLORIDE 25 MG/1
25 TABLET, FILM COATED ORAL 2 TIMES DAILY
Qty: 60 TABLET | Refills: 2 | Status: SHIPPED | OUTPATIENT
Start: 2023-10-09

## 2023-10-09 RX ORDER — CETIRIZINE HYDROCHLORIDE 10 MG/1
10 TABLET ORAL DAILY
COMMUNITY

## 2023-10-09 RX ORDER — ERGOCALCIFEROL 1.25 MG/1
50000 CAPSULE ORAL
COMMUNITY
Start: 2023-08-17

## 2023-10-09 RX ORDER — METFORMIN HYDROCHLORIDE 500 MG/1
500 TABLET, EXTENDED RELEASE ORAL 2 TIMES DAILY
COMMUNITY
Start: 2023-04-11 | End: 2028-10-26

## 2023-10-09 NOTE — PROGRESS NOTES
Subjective     Yuko Lopez is a 33 y.o. female.     History of Present Illness  Patient is here today for a follow-up on hypertension, hyperlipidemia, diabetes, and depression/anxiety  Pt is not exercising at all.   She tries to eat a well balanced diet- the mounjaro curbs her appetite  She is down 11lbs since April.   She is trying to cut back on caffeine.   She works at [employer].      Hypertension-patient is prescribed Norvasc 10 mg daily, losartan-hydrochlorothiazide 100-25 mg daily. She has not been monitoring her BP at home. Denies CP, SOA, dizziness, HA.      Hyperlipidemia-patient was on Lipitor 10 mg daily. She is trying to eat a well balanced diet.       Diabetes-patient sees endocrinology for this.  She is on  metformin  mg twice daily and mounjaro 7.5mg weekly.  Monitors BS with [employer] continuous glucose monitor and they have been running between . Its been running higher the last few weeks. She has had stress at work.      Depression/anxiety- pt is not currently on any medication. She states her depression is starting to worsen right now with the season change. She is wanting to sleep all the time. She was previously on wellbutrin but she states it affected her memory. She has tried zoloft. Denies SI or HI.      Labs- due  Pap smear- had hysterectomy  Colonoscopy- 8/30/21     Vaccines:  Flu- N/A   PNA- 2022   Tdap- 5/2014   Covid-19- UTD      Dental exam- UTD  Eye exam- UTD            The following portions of the patient's history were reviewed and updated as appropriate: allergies, current medications, past family history, past medical history, past social history, past surgical history, and problem list.    Review of Systems   Constitutional:  Positive for fatigue. Negative for chills and fever.   Respiratory:  Negative for chest tightness and shortness of breath.    Cardiovascular:  Negative for chest pain and palpitations.   Gastrointestinal:  Negative for nausea and  vomiting.   Neurological:  Negative for dizziness and headache.   Psychiatric/Behavioral:  Positive for depressed mood and stress. Negative for self-injury and suicidal ideas. The patient is nervous/anxious.        Objective     /99   Pulse 114   Temp 98.3 øF (36.8 øC) (Oral)   Wt 102 kg (225 lb)   SpO2 99%   BMI 36.32 kg/mý     Current Outpatient Medications on File Prior to Visit   Medication Sig Dispense Refill    amLODIPine (NORVASC) 10 MG tablet Take 1 tablet by mouth Daily.      ergocalciferol (ERGOCALCIFEROL) 1.25 MG (25859 UT) capsule Take 1 capsule by mouth Every 7 (Seven) Days.      metFORMIN ER (GLUCOPHAGE-XR) 500 MG 24 hr tablet Take 1 tablet by mouth 2 (Two) Times a Day.      atorvastatin (Lipitor) 10 MG tablet Take 1 tablet by mouth Daily. 90 tablet 1    cetirizine (zyrTEC) 10 MG tablet Take 1 tablet by mouth Daily.      Continuous Blood Gluc Sensor (FreeStyle Dandy 2 Sensor) misc       losartan-hydrochlorothiazide (Hyzaar) 100-25 MG per tablet Take 1 tablet by mouth Daily. 90 tablet 1    Mounjaro 7.5 MG/0.5ML solution pen-injector ADMINISTER 0.5 ML UNDER THE SKIN EVERY 7 DAYS      [DISCONTINUED] amLODIPine (NORVASC) 5 MG tablet TAKE 1 TABLET BY MOUTH DAILY 90 tablet 0     No current facility-administered medications on file prior to visit.        Class 2 Severe Obesity (BMI >=35 and <=39.9). Obesity-related health conditions include the following: hypertension, diabetes mellitus, and dyslipidemias. Obesity is unchanged. BMI is is above average; BMI management plan is completed. We discussed portion control and increasing exercise.       Physical Exam  Constitutional:       General: She is not in acute distress.     Appearance: Normal appearance. She is obese. She is not ill-appearing.   HENT:      Head: Normocephalic and atraumatic.   Cardiovascular:      Rate and Rhythm: Regular rhythm. Tachycardia present.      Heart sounds: No murmur heard.  Pulmonary:      Effort: Pulmonary effort is  normal.      Breath sounds: Normal breath sounds.   Musculoskeletal:         General: Normal range of motion.   Skin:     General: Skin is warm and dry.   Neurological:      General: No focal deficit present.      Mental Status: She is alert and oriented to person, place, and time.   Psychiatric:         Mood and Affect: Mood normal.         Behavior: Behavior normal.         Thought Content: Thought content normal.         Judgment: Judgment normal.           Assessment & Plan     Diagnoses and all orders for this visit:    1. Essential hypertension (Primary)  Comments:  deteriorated  cont losartan-Hctz and norvasc  restarat hydralazine  f/u 1 mo  Orders:  -     Comprehensive Metabolic Panel; Future  -     Lipid Panel; Future  -     hydrALAZINE (APRESOLINE) 25 MG tablet; Take 1 tablet by mouth 2 (Two) Times a Day.  Dispense: 60 tablet; Refill: 2    2. Need for immunization against influenza  -     Fluzone >6 Months ([id number])    3. Type 2 diabetes mellitus with other specified complication, without long-term current use of insulin  Comments:  stable  cont metformin and mounjaro  check labs  Orders:  -     Comprehensive Metabolic Panel; Future  -     Hemoglobin A1c; Future  -     Lipid Panel; Future  -     Microalbumin / Creatinine Urine Ratio - Urine, Clean Catch; Future    4. Hyperlipidemia, unspecified hyperlipidemia type  Comments:  stable  cont lipitor  work on diet and exercise    5. Moderate episode of recurrent major depressive disorder  Comments:  deteriorated  likely some seasonal depression  start zoloft  f/u 1 mo  denies SI or HI  Orders:  -     sertraline (Zoloft) 25 MG tablet; Take 1 tablet by mouth Daily.  Dispense: 30 tablet; Refill: 0

## 2023-10-09 NOTE — PATIENT INSTRUCTIONS
Work on diet and exercise  Check labs  Start hydralazine 2 times daily  Monitor BP  Restart zoloft

## 2023-10-10 DIAGNOSIS — E11.69 TYPE 2 DIABETES MELLITUS WITH OTHER SPECIFIED COMPLICATION, WITHOUT LONG-TERM CURRENT USE OF INSULIN: ICD-10-CM

## 2023-10-10 DIAGNOSIS — R80.9 MICROALBUMINURIA: Primary | ICD-10-CM

## 2023-10-10 RX ORDER — DAPAGLIFLOZIN 10 MG/1
10 TABLET, FILM COATED ORAL DAILY
Qty: 90 TABLET | Refills: 1 | Status: SHIPPED | OUTPATIENT
Start: 2023-10-10

## 2023-10-10 RX ORDER — HYDRALAZINE HYDROCHLORIDE 25 MG/1
25 TABLET, FILM COATED ORAL 2 TIMES DAILY
Qty: 180 TABLET | OUTPATIENT
Start: 2023-10-10

## 2023-10-12 ENCOUNTER — TELEPHONE (OUTPATIENT)
Dept: FAMILY MEDICINE CLINIC | Facility: CLINIC | Age: 33
End: 2023-10-12
Payer: COMMERCIAL

## 2023-10-12 NOTE — TELEPHONE ENCOUNTER
Prior Auth completed for Farxiga 10MG tablets via covermymeds. Pending determination.    [id number] - PA Case ID: [id number] - Rx #: [id number]

## 2023-10-12 NOTE — TELEPHONE ENCOUNTER
Prior Authorization for  Farxiga 10MG tablets approved.     Approved today  CaseId:[id number];Status:Approved;Review Type:Prior Auth;Coverage Start Date:09/12/2023;Coverage End Date:10/11/2024;    Faxed approval to pharmacy.

## 2023-11-06 ENCOUNTER — TRANSCRIBE ORDERS (OUTPATIENT)
Dept: ADMINISTRATIVE | Facility: HOSPITAL | Age: 33
End: 2023-11-06
Payer: COMMERCIAL

## 2023-11-06 DIAGNOSIS — N18.2 CHRONIC RENAL DISEASE, STAGE II: Primary | ICD-10-CM

## 2023-11-07 DIAGNOSIS — F33.1 MODERATE EPISODE OF RECURRENT MAJOR DEPRESSIVE DISORDER: ICD-10-CM

## 2023-11-07 DIAGNOSIS — I10 ESSENTIAL HYPERTENSION: ICD-10-CM

## 2023-11-07 RX ORDER — SERTRALINE HYDROCHLORIDE 25 MG/1
25 TABLET, FILM COATED ORAL DAILY
Qty: 30 TABLET | Refills: 0 | Status: SHIPPED | OUTPATIENT
Start: 2023-11-07

## 2023-11-07 RX ORDER — HYDRALAZINE HYDROCHLORIDE 25 MG/1
25 TABLET, FILM COATED ORAL 2 TIMES DAILY
Qty: 180 TABLET | Refills: 0 | Status: SHIPPED | OUTPATIENT
Start: 2023-11-07

## 2023-11-20 RX ORDER — HYDROXYZINE HYDROCHLORIDE 25 MG/1
25 TABLET, FILM COATED ORAL 3 TIMES DAILY PRN
Qty: 30 TABLET | Refills: 0 | Status: SHIPPED | OUTPATIENT
Start: 2023-11-20

## 2023-11-20 RX ORDER — AMLODIPINE BESYLATE 10 MG/1
10 TABLET ORAL DAILY
Qty: 90 TABLET | Refills: 0 | Status: SHIPPED | OUTPATIENT
Start: 2023-11-20

## 2023-12-15 ENCOUNTER — OFFICE VISIT (OUTPATIENT)
Dept: FAMILY MEDICINE CLINIC | Facility: CLINIC | Age: 33
End: 2023-12-15
Payer: COMMERCIAL

## 2023-12-15 VITALS
DIASTOLIC BLOOD PRESSURE: 93 MMHG | TEMPERATURE: 97.5 F | SYSTOLIC BLOOD PRESSURE: 138 MMHG | BODY MASS INDEX: 36.15 KG/M2 | HEART RATE: 101 BPM | WEIGHT: 224 LBS | OXYGEN SATURATION: 97 %

## 2023-12-15 DIAGNOSIS — I10 ESSENTIAL HYPERTENSION: ICD-10-CM

## 2023-12-15 DIAGNOSIS — L91.0 KELOID: ICD-10-CM

## 2023-12-15 DIAGNOSIS — F33.1 MODERATE EPISODE OF RECURRENT MAJOR DEPRESSIVE DISORDER: Primary | ICD-10-CM

## 2023-12-15 DIAGNOSIS — F33.1 MODERATE EPISODE OF RECURRENT MAJOR DEPRESSIVE DISORDER: ICD-10-CM

## 2023-12-15 PROCEDURE — 99213 OFFICE O/P EST LOW 20 MIN: CPT | Performed by: NURSE PRACTITIONER

## 2023-12-15 RX ORDER — SERTRALINE HYDROCHLORIDE 25 MG/1
25 TABLET, FILM COATED ORAL DAILY
Qty: 30 TABLET | Refills: 0 | Status: SHIPPED | OUTPATIENT
Start: 2023-12-15 | End: 2023-12-15

## 2023-12-15 RX ORDER — HYDRALAZINE HYDROCHLORIDE 25 MG/1
25 TABLET, FILM COATED ORAL 2 TIMES DAILY
Qty: 180 TABLET | Refills: 0 | Status: SHIPPED | OUTPATIENT
Start: 2023-12-15

## 2023-12-15 RX ORDER — LOSARTAN POTASSIUM 100 MG/1
100 TABLET ORAL DAILY
COMMUNITY
Start: 2023-11-01

## 2023-12-15 NOTE — PROGRESS NOTES
Subjective     Yuko Lopez is a 33 y.o. female.     History of Present Illness  Patient is here today to follow-up on hypertension and depression.  Since being seen patient was referred to nephrology for proteinuria.  They stopped her Hctz  She is on farxiga.   Pt has a keloid on her left ear that she wants to see derm for.     Hypertension-patient is prescribed Norvasc 10 mg daily, losartan 100 mg daily, hydralazine 25 mg twice daily.     Depression-patient was started on Zoloft 25 mg daily.  She reports that the med is helping but she feels like she could use a higher dose. Denies SI or HI.       The following portions of the patient's history were reviewed and updated as appropriate: allergies, current medications, past family history, past medical history, past social history, past surgical history, and problem list.    Review of Systems   Constitutional:  Negative for chills, fatigue and fever.   Respiratory:  Negative for chest tightness and shortness of breath.    Neurological:  Negative for dizziness, light-headedness and headache.   Psychiatric/Behavioral:  Positive for depressed mood. Negative for self-injury, suicidal ideas and stress. The patient is not nervous/anxious.        Objective     /93 (BP Location: Right arm, Patient Position: Sitting, Cuff Size: Large Adult)   Pulse 101   Temp 97.5 °F (36.4 °C) (Tympanic)   Wt 102 kg (224 lb)   SpO2 97%   BMI 36.15 kg/m²     Current Outpatient Medications on File Prior to Visit   Medication Sig Dispense Refill    losartan (COZAAR) 100 MG tablet Take 1 tablet by mouth Daily.      amLODIPine (NORVASC) 10 MG tablet TAKE 1 TABLET BY MOUTH DAILY 90 tablet 0    atorvastatin (Lipitor) 10 MG tablet Take 1 tablet by mouth Daily. 90 tablet 1    cetirizine (zyrTEC) 10 MG tablet Take 1 tablet by mouth Daily.      Continuous Blood Gluc Sensor (FreeStyle Dandy 2 Sensor) misc       dapagliflozin Propanediol (Farxiga) 10 MG tablet Take 10 mg by mouth Daily. 90  tablet 1    ergocalciferol (ERGOCALCIFEROL) 1.25 MG (12539 UT) capsule Take 1 capsule by mouth Every 7 (Seven) Days.      hydrOXYzine (ATARAX) 25 MG tablet Take 1 tablet by mouth 3 (Three) Times a Day As Needed for Anxiety. 30 tablet 0    metFORMIN ER (GLUCOPHAGE-XR) 500 MG 24 hr tablet Take 1 tablet by mouth 2 (Two) Times a Day.      Mounjaro 7.5 MG/0.5ML solution pen-injector ADMINISTER 0.5 ML UNDER THE SKIN EVERY 7 DAYS      [DISCONTINUED] hydrALAZINE (APRESOLINE) 25 MG tablet TAKE 1 TABLET BY MOUTH TWICE DAILY 180 tablet 0    [DISCONTINUED] losartan-hydrochlorothiazide (Hyzaar) 100-25 MG per tablet Take 1 tablet by mouth Daily. 90 tablet 1    [DISCONTINUED] sertraline (ZOLOFT) 25 MG tablet TAKE 1 TABLET BY MOUTH DAILY 30 tablet 0     No current facility-administered medications on file prior to visit.                 Physical Exam  Constitutional:       General: She is not in acute distress.     Appearance: Normal appearance. She is not ill-appearing.   HENT:      Head: Normocephalic and atraumatic.      Ears:      Comments: Large keloid left ear  Cardiovascular:      Rate and Rhythm: Normal rate and regular rhythm.      Heart sounds: No murmur heard.  Pulmonary:      Effort: Pulmonary effort is normal.      Breath sounds: Normal breath sounds.   Skin:     General: Skin is warm and dry.   Neurological:      General: No focal deficit present.      Mental Status: She is alert and oriented to person, place, and time.   Psychiatric:         Mood and Affect: Mood normal.         Behavior: Behavior normal.         Thought Content: Thought content normal.         Judgment: Judgment normal.           Assessment & Plan     Diagnoses and all orders for this visit:    1. Moderate episode of recurrent major depressive disorder (Primary)  Comments:  some improvement  inc zoloft to 50mg daily  denies SI or HI  Orders:  -     sertraline (Zoloft) 50 MG tablet; Take 1 tablet by mouth Daily.  Dispense: 90 tablet; Refill: 1    2.  Essential hypertension  Comments:  improved  cont current treatment  sees renal    3. Keloid  -     Ambulatory Referral to Dermatology

## 2024-02-27 RX ORDER — AMLODIPINE BESYLATE 10 MG/1
10 TABLET ORAL DAILY
Qty: 90 TABLET | Refills: 0 | Status: SHIPPED | OUTPATIENT
Start: 2024-02-27

## 2024-03-21 DIAGNOSIS — E78.5 HYPERLIPIDEMIA, UNSPECIFIED HYPERLIPIDEMIA TYPE: ICD-10-CM

## 2024-03-21 RX ORDER — ATORVASTATIN CALCIUM 10 MG/1
10 TABLET, FILM COATED ORAL DAILY
Qty: 90 TABLET | Refills: 1 | Status: SHIPPED | OUTPATIENT
Start: 2024-03-21

## 2024-04-02 DIAGNOSIS — R80.9 MICROALBUMINURIA: ICD-10-CM

## 2024-04-02 DIAGNOSIS — E11.69 TYPE 2 DIABETES MELLITUS WITH OTHER SPECIFIED COMPLICATION, WITHOUT LONG-TERM CURRENT USE OF INSULIN: ICD-10-CM

## 2024-04-02 RX ORDER — DAPAGLIFLOZIN 10 MG/1
1 TABLET, FILM COATED ORAL DAILY
Qty: 90 TABLET | Refills: 1 | Status: SHIPPED | OUTPATIENT
Start: 2024-04-02

## 2024-07-05 NOTE — PROGRESS NOTES
Subjective     Yuko Lopez is a 34 y.o. female.     History of Present Illness  Patient is here today to discuss anxiety and depression.  She has had increased anxiety as of lately.  She is on zoloft 50mg daily  She states she is sleeping often  She is not able to complete tasks- struggles with concentration.   She is crying often  Denies SI or HI  She is really wanting to try something for the ADD  She has tried wellbutrin in the past  Has not been on anything else  States her BP is doing better at home  Will try to stay away from stimulants due to   Anxiety  Presents for initial visit. Onset was at an unknown time. The problem is worse since onset. Symptoms include decreased concentration, depressed mood, excessive worry, insomnia, irritability, nausea, nervous/anxious behavior and malaise/fatigue.  Patient reports no chest pain, confusion, dizziness, dry mouth, obsessions, palpitations or shortness of breath. Symptoms occur constantly. The severity of symptoms is severe. The symptoms are aggravated by nothing, family issues and work stress. Nothing, family issues and work stress aggravates the symptoms. The patient sleeps 10 hours per night. The quality of sleep is poor. Her past medical history is significant for depression.   Hypertension  Associated symptoms include anxiety and malaise/fatigue. Pertinent negatives include no chest pain, palpitations or shortness of breath.   DepressionSymptoms include decreased concentration, depressed mood, excessive worry, insomnia, irritability, nervousness/anxiety, malaise/fatigue and nausea. Patient is not experiencing: confusion, dry mouth, obsessions, palpitations, shortness of breath, chest pain and dizziness.  Her past medical history is significant for depression.        The following portions of the patient's history were reviewed and updated as appropriate: allergies, current medications, past family history, past medical history, past social history, past  "surgical history, and problem list.    Review of Systems   Constitutional:  Positive for fatigue, irritability and malaise/fatigue. Negative for chills and fever.   Respiratory:  Negative for chest tightness and shortness of breath.    Cardiovascular:  Negative for chest pain and palpitations.   Gastrointestinal:  Positive for nausea.   Neurological:  Negative for dizziness, headache and confusion.   Psychiatric/Behavioral:  Positive for decreased concentration and depressed mood. The patient is nervous/anxious and has insomnia.        Objective     /98 (BP Location: Left arm, Patient Position: Sitting, Cuff Size: Large Adult)   Pulse 97   Temp 97.3 °F (36.3 °C) (Temporal)   Ht 167.6 cm (66\")   Wt 100 kg (221 lb)   SpO2 98%   BMI 35.67 kg/m²     Current Outpatient Medications on File Prior to Visit   Medication Sig Dispense Refill    amLODIPine (NORVASC) 10 MG tablet TAKE 1 TABLET BY MOUTH DAILY 90 tablet 0    atorvastatin (LIPITOR) 10 MG tablet TAKE 1 TABLET BY MOUTH DAILY 90 tablet 1    cetirizine (zyrTEC) 10 MG tablet Take 1 tablet by mouth Daily.      Continuous Blood Gluc Sensor (FreeStyle Dandy 2 Sensor) misc       Farxiga 10 MG tablet TAKE 1 TABLET BY MOUTH DAILY 90 tablet 1    hydrALAZINE (APRESOLINE) 25 MG tablet TAKE 1 TABLET BY MOUTH TWICE DAILY 180 tablet 0    hydrOXYzine (ATARAX) 25 MG tablet Take 1 tablet by mouth 3 (Three) Times a Day As Needed for Anxiety. 30 tablet 0    ketoconazole (NIZORAL) 2 % shampoo       losartan (COZAAR) 100 MG tablet Take 1 tablet by mouth Daily.      metFORMIN ER (GLUCOPHAGE-XR) 500 MG 24 hr tablet Take 1 tablet by mouth 2 (Two) Times a Day.      Mounjaro 7.5 MG/0.5ML solution pen-injector ADMINISTER 0.5 ML UNDER THE SKIN EVERY 7 DAYS      [DISCONTINUED] sertraline (Zoloft) 50 MG tablet Take 1 tablet by mouth Daily. 90 tablet 1    [DISCONTINUED] ergocalciferol (ERGOCALCIFEROL) 1.25 MG (12794 UT) capsule Take 1 capsule by mouth Every 7 (Seven) Days.       No " current facility-administered medications on file prior to visit.                 Physical Exam  Constitutional:       General: She is not in acute distress.     Appearance: Normal appearance. She is not ill-appearing.   HENT:      Head: Normocephalic and atraumatic.   Eyes:      Extraocular Movements: Extraocular movements intact.   Cardiovascular:      Rate and Rhythm: Normal rate and regular rhythm.      Heart sounds: No murmur heard.  Pulmonary:      Effort: Pulmonary effort is normal. No respiratory distress.   Neurological:      General: No focal deficit present.      Mental Status: She is alert and oriented to person, place, and time.   Psychiatric:         Mood and Affect: Mood normal.         Behavior: Behavior normal.         Thought Content: Thought content normal.         Judgment: Judgment normal.           Assessment & Plan     Diagnoses and all orders for this visit:    1. Anxiety (Primary)  Comments:  deteriorated  increase zoloft to 100mg daily  may need to try buspar in future  denies SI or HI  f/u 1 mo  Orders:  -     sertraline (Zoloft) 100 MG tablet; Take 1 tablet by mouth Daily.  Dispense: 90 tablet; Refill: 1    2. Attention deficit  Comments:  deteriorated  avoid stimulant d/t HTN  start straterra  f/u 1 mo  reviewed previous ADHD screening  Orders:  -     atomoxetine (Strattera) 40 MG capsule; Take 1 pill daily for 3 days and then increase to 2 pills daily.  Dispense: 60 capsule; Refill: 0              Answers submitted by the patient for this visit:  Primary Reason for Visit (Submitted on 7/6/2024)  What is the primary reason for your visit?: Anxiety

## 2024-07-08 ENCOUNTER — OFFICE VISIT (OUTPATIENT)
Dept: FAMILY MEDICINE CLINIC | Facility: CLINIC | Age: 34
End: 2024-07-08
Payer: COMMERCIAL

## 2024-07-08 VITALS
OXYGEN SATURATION: 98 % | TEMPERATURE: 97.3 F | DIASTOLIC BLOOD PRESSURE: 98 MMHG | HEIGHT: 66 IN | SYSTOLIC BLOOD PRESSURE: 137 MMHG | HEART RATE: 97 BPM | WEIGHT: 221 LBS | BODY MASS INDEX: 35.52 KG/M2

## 2024-07-08 DIAGNOSIS — R41.840 ATTENTION DEFICIT: ICD-10-CM

## 2024-07-08 DIAGNOSIS — F41.9 ANXIETY: Primary | ICD-10-CM

## 2024-07-08 PROCEDURE — 99214 OFFICE O/P EST MOD 30 MIN: CPT | Performed by: NURSE PRACTITIONER

## 2024-07-08 RX ORDER — ATOMOXETINE 40 MG/1
CAPSULE ORAL
Qty: 60 CAPSULE | Refills: 0 | Status: SHIPPED | OUTPATIENT
Start: 2024-07-08

## 2024-07-08 RX ORDER — KETOCONAZOLE 20 MG/ML
SHAMPOO TOPICAL
COMMUNITY
Start: 2024-03-22

## 2024-07-08 RX ORDER — SERTRALINE HYDROCHLORIDE 100 MG/1
100 TABLET, FILM COATED ORAL DAILY
Qty: 90 TABLET | Refills: 1 | Status: SHIPPED | OUTPATIENT
Start: 2024-07-08

## 2024-08-02 NOTE — PROGRESS NOTES
Subjective     Yuko Lopez is a 34 y.o. female.     History of Present Illness  Patient is here today for 1 month follow-up on anxiety and ADHD.  Pt states she is not feeling well  Her coworker has covid  She tested today and it is negative  She started having symptoms last Tues  She has fatigue, HA, cough.  Denies fevers  She has been taking zyrtec and tylenol.     Anxiety- last month patient's Zoloft was increased to 100 mg daily.  Patient reports that it has helped. Denies SI or HI    ADHD-patient was started on Strattera 80 mg daily at her last visit.  We are trying to avoid stimulants due to her elevated blood pressure and heart rate. She has seen an improvement in her concentration. Denies side effects       The following portions of the patient's history were reviewed and updated as appropriate: allergies, current medications, past family history, past medical history, past social history, past surgical history, and problem list.    Review of Systems   Constitutional:  Positive for fatigue. Negative for chills and fever.   Respiratory:  Positive for cough. Negative for chest tightness and shortness of breath.    Cardiovascular:  Negative for chest pain and palpitations.   Gastrointestinal:  Negative for nausea.   Neurological:  Negative for dizziness and confusion.   Psychiatric/Behavioral:  Negative for decreased concentration, self-injury, suicidal ideas and depressed mood. The patient is not nervous/anxious.        Objective     /92 (BP Location: Left arm, Patient Position: Sitting, Cuff Size: Large Adult)   Pulse (!) 127   Temp 98.5 °F (36.9 °C) (Tympanic)   Wt 96.2 kg (212 lb)   SpO2 99%   BMI 34.22 kg/m²     Current Outpatient Medications on File Prior to Visit   Medication Sig Dispense Refill    amLODIPine (NORVASC) 10 MG tablet TAKE 1 TABLET BY MOUTH DAILY 90 tablet 0    atorvastatin (LIPITOR) 10 MG tablet TAKE 1 TABLET BY MOUTH DAILY 90 tablet 1    cetirizine (zyrTEC) 10 MG tablet Take  1 tablet by mouth Daily.      Continuous Blood Gluc Sensor (FreeStyle Dandy 2 Sensor) misc       Farxiga 10 MG tablet TAKE 1 TABLET BY MOUTH DAILY 90 tablet 1    hydrALAZINE (APRESOLINE) 25 MG tablet TAKE 1 TABLET BY MOUTH TWICE DAILY 180 tablet 0    hydrOXYzine (ATARAX) 25 MG tablet Take 1 tablet by mouth 3 (Three) Times a Day As Needed for Anxiety. 30 tablet 0    ketoconazole (NIZORAL) 2 % shampoo       losartan (COZAAR) 100 MG tablet Take 1 tablet by mouth Daily.      metFORMIN ER (GLUCOPHAGE-XR) 500 MG 24 hr tablet Take 1 tablet by mouth 2 (Two) Times a Day.      Mounjaro 7.5 MG/0.5ML solution pen-injector ADMINISTER 0.5 ML UNDER THE SKIN EVERY 7 DAYS      sertraline (Zoloft) 100 MG tablet Take 1 tablet by mouth Daily. 90 tablet 1    [DISCONTINUED] atomoxetine (Strattera) 40 MG capsule Take 1 pill daily for 3 days and then increase to 2 pills daily. 60 capsule 0     No current facility-administered medications on file prior to visit.                 Physical Exam  Constitutional:       General: She is not in acute distress.     Appearance: Normal appearance. She is not ill-appearing.   HENT:      Head: Normocephalic and atraumatic.   Eyes:      Extraocular Movements: Extraocular movements intact.   Cardiovascular:      Rate and Rhythm: Tachycardia present.      Comments: Has a cold- possibly related to symptoms  Pulmonary:      Effort: Pulmonary effort is normal. No respiratory distress.   Neurological:      General: No focal deficit present.      Mental Status: She is alert and oriented to person, place, and time.   Psychiatric:         Mood and Affect: Mood normal.         Behavior: Behavior normal.         Thought Content: Thought content normal.         Judgment: Judgment normal.           Assessment & Plan     Diagnoses and all orders for this visit:    1. Attention deficit (Primary)  Comments:  improved  cont straterra 80mg daily  call for issues  Orders:  -     atomoxetine (Strattera) 80 MG capsule; Take  1 capsule by mouth Daily.  Dispense: 90 capsule; Refill: 1    2. Anxiety  Comments:  improved  cont zoloft 100mg daily  denies SI or HI    3. Upper respiratory tract infection, unspecified type  Comments:  appears to  be viral  cont zyrtec  tylenol as needed  push fluids

## 2024-08-05 ENCOUNTER — OFFICE VISIT (OUTPATIENT)
Dept: FAMILY MEDICINE CLINIC | Facility: CLINIC | Age: 34
End: 2024-08-05
Payer: COMMERCIAL

## 2024-08-05 VITALS
OXYGEN SATURATION: 99 % | SYSTOLIC BLOOD PRESSURE: 132 MMHG | TEMPERATURE: 98.5 F | HEART RATE: 127 BPM | DIASTOLIC BLOOD PRESSURE: 92 MMHG | BODY MASS INDEX: 34.22 KG/M2 | WEIGHT: 212 LBS

## 2024-08-05 DIAGNOSIS — R41.840 ATTENTION DEFICIT: Primary | ICD-10-CM

## 2024-08-05 DIAGNOSIS — J06.9 UPPER RESPIRATORY TRACT INFECTION, UNSPECIFIED TYPE: ICD-10-CM

## 2024-08-05 DIAGNOSIS — F41.9 ANXIETY: ICD-10-CM

## 2024-08-05 PROCEDURE — 99214 OFFICE O/P EST MOD 30 MIN: CPT | Performed by: NURSE PRACTITIONER

## 2024-08-05 RX ORDER — ATOMOXETINE 80 MG/1
80 CAPSULE ORAL DAILY
Qty: 90 CAPSULE | Refills: 1 | Status: SHIPPED | OUTPATIENT
Start: 2024-08-05

## 2024-08-05 NOTE — PATIENT INSTRUCTIONS
Push water intake  Take tylenol or ibuprofen for fever or discomfort  Get plenty of rest  Call if no improvement or worsening symptoms

## 2024-10-07 DIAGNOSIS — E78.5 HYPERLIPIDEMIA, UNSPECIFIED HYPERLIPIDEMIA TYPE: ICD-10-CM

## 2024-10-07 RX ORDER — ATORVASTATIN CALCIUM 10 MG/1
10 TABLET, FILM COATED ORAL DAILY
Qty: 90 TABLET | Refills: 1 | Status: SHIPPED | OUTPATIENT
Start: 2024-10-07

## 2024-10-16 ENCOUNTER — TELEPHONE (OUTPATIENT)
Dept: FAMILY MEDICINE CLINIC | Facility: CLINIC | Age: 34
End: 2024-10-16
Payer: COMMERCIAL

## 2024-10-17 NOTE — TELEPHONE ENCOUNTER
Called [facility] at [phone] to initiated a prior authorization on patient Farxiga 10 mg tablets. Spoke to Abiodun alonso/ [facility] PA department who said they don't cover patient PA for her medications. She advise me to call the number on the back of the patient insurance card.     Called [facility] at [phone]. Reach Leslie SALAZAR a PA advocate who said that patient PA request need to be sent to [facility] Called the number she gave me at [phone] to initiated the PA for Farxiga 10 mg via over the phone. Reach the computer services line who went though the process of completing a PA for said medication. Patient and provider information was given and PA request was started. Information to completed the PA will be faxed to the office. PA case # [id number].     Waiting on information to be faxed to the office. Once completed and signed by the provider it will be faxed back to [facility]. Pending determination.

## 2024-11-06 DIAGNOSIS — I10 ESSENTIAL HYPERTENSION: ICD-10-CM

## 2024-11-06 RX ORDER — HYDRALAZINE HYDROCHLORIDE 25 MG/1
25 TABLET, FILM COATED ORAL 2 TIMES DAILY
Qty: 180 TABLET | Refills: 0 | Status: SHIPPED | OUTPATIENT
Start: 2024-11-06

## 2025-01-07 DIAGNOSIS — F41.9 ANXIETY: ICD-10-CM

## 2025-01-07 RX ORDER — SERTRALINE HYDROCHLORIDE 100 MG/1
100 TABLET, FILM COATED ORAL DAILY
Qty: 90 TABLET | Refills: 1 | Status: SHIPPED | OUTPATIENT
Start: 2025-01-07

## 2025-01-20 RX ORDER — METFORMIN HYDROCHLORIDE 500 MG/1
500 TABLET, EXTENDED RELEASE ORAL 2 TIMES DAILY
Qty: 180 TABLET | Refills: 0 | Status: SHIPPED | OUTPATIENT
Start: 2025-01-20

## 2025-01-20 RX ORDER — METFORMIN HYDROCHLORIDE 500 MG/1
500 TABLET, EXTENDED RELEASE ORAL 2 TIMES DAILY
Qty: 60 TABLET | Refills: 5 | Status: SHIPPED | OUTPATIENT
Start: 2025-01-20 | End: 2025-01-20

## 2025-01-20 RX ORDER — HYDROXYZINE HYDROCHLORIDE 25 MG/1
25 TABLET, FILM COATED ORAL 3 TIMES DAILY PRN
Qty: 30 TABLET | Refills: 0 | Status: SHIPPED | OUTPATIENT
Start: 2025-01-20

## 2025-02-04 RX ORDER — LOSARTAN POTASSIUM 100 MG/1
100 TABLET ORAL DAILY
Qty: 90 TABLET | Refills: 1 | Status: SHIPPED | OUTPATIENT
Start: 2025-02-04

## 2025-02-25 ENCOUNTER — OFFICE VISIT (OUTPATIENT)
Dept: FAMILY MEDICINE CLINIC | Facility: CLINIC | Age: 35
End: 2025-02-25
Payer: COMMERCIAL

## 2025-02-25 VITALS
WEIGHT: 212 LBS | SYSTOLIC BLOOD PRESSURE: 143 MMHG | BODY MASS INDEX: 34.22 KG/M2 | HEART RATE: 96 BPM | OXYGEN SATURATION: 97 % | TEMPERATURE: 98.6 F | DIASTOLIC BLOOD PRESSURE: 92 MMHG

## 2025-02-25 DIAGNOSIS — E78.5 HYPERLIPIDEMIA, UNSPECIFIED HYPERLIPIDEMIA TYPE: Primary | ICD-10-CM

## 2025-02-25 DIAGNOSIS — F41.9 ANXIETY: ICD-10-CM

## 2025-02-25 DIAGNOSIS — E11.69 TYPE 2 DIABETES MELLITUS WITH OTHER SPECIFIED COMPLICATION, WITHOUT LONG-TERM CURRENT USE OF INSULIN: ICD-10-CM

## 2025-02-25 DIAGNOSIS — Z23 NEED FOR TDAP VACCINATION: ICD-10-CM

## 2025-02-25 DIAGNOSIS — R41.840 ATTENTION DEFICIT: ICD-10-CM

## 2025-02-25 DIAGNOSIS — Z23 NEED FOR IMMUNIZATION AGAINST INFLUENZA: ICD-10-CM

## 2025-02-25 DIAGNOSIS — I10 ESSENTIAL HYPERTENSION: ICD-10-CM

## 2025-02-25 PROCEDURE — 90471 IMMUNIZATION ADMIN: CPT | Performed by: NURSE PRACTITIONER

## 2025-02-25 PROCEDURE — 90715 TDAP VACCINE 7 YRS/> IM: CPT | Performed by: NURSE PRACTITIONER

## 2025-02-25 PROCEDURE — 90472 IMMUNIZATION ADMIN EACH ADD: CPT | Performed by: NURSE PRACTITIONER

## 2025-02-25 PROCEDURE — 90656 IIV3 VACC NO PRSV 0.5 ML IM: CPT | Performed by: NURSE PRACTITIONER

## 2025-02-25 PROCEDURE — 99214 OFFICE O/P EST MOD 30 MIN: CPT | Performed by: NURSE PRACTITIONER

## 2025-02-25 NOTE — PROGRESS NOTES
Subjective     Yuko Lopez is a 34 y.o. adult.     History of Present Illness  Patient is here today for a follow-up on hypertension, hyperlipidemia, diabetes, ADD and depression/anxiety  Pt has not been exercising  She is eating well.      Hypertension-patient is prescribed Norvasc 10 mg daily, losartan 100mg daily and hydralazine 25mg bid. She has not been monitoring her BP at home, but she will. Denies CP, SOA, dizziness, HA.      Hyperlipidemia-patient was on Lipitor 10 mg daily. She is trying to eat a well balanced diet.       Diabetes-patient sees endocrinology for this.  She is on  metformin  mg twice daily and farxiga 10mg daily. She stopped the mounjaro bc she was having hypoglycemia and her BS has been stable.    Monitors BS with eLama continuous glucose monitor and they have been running between      Depression/anxiety- pt is currently on Zoloft 100 mg daily and hydroxyzine as needed. Her friend was murdered this year. She states she is stable. Denies SI or HI.     ADD- patient is currently on Strattera 80 mg daily. She is doing well on the med.      Labs- due  Pap smear- had hysterectomy  Colonoscopy- 8/30/21     Vaccines:  Flu- due  PNA- 2022   Tdap- due  Covid-19- UTD      Dental exam- UTD  Eye exam- UTD      Symptoms include: abdominal pain (sporadic low abdomen).   Pertinent negative symptoms include no chest pain, no chills, no congestion, no cough, no diaphoresis, no fatigue, no fever, no myalgias, no nausea, no neck pain, no numbness, no rash, no sore throat, no swollen glands, no dysuria, no vomiting and no weakness.   Hypertension  Associated symptoms include anxiety. Pertinent negatives include no chest pain, neck pain or shortness of breath.   Hyperlipidemia  Pertinent negatives include no chest pain, myalgias or shortness of breath.   Diabetes  Pertinent negatives for hypoglycemia include no dizziness or nervousness/anxiousness. Pertinent negatives for diabetes include no  chest pain, no fatigue and no weakness.   Anxiety   Patient reports no chest pain, depressed mood, dizziness, nausea, nervous/anxious behavior, shortness of breath or suicidal ideas. Her past medical history is significant for depression.   DepressionPatient is not experiencing: depressed mood, nervousness/anxiety, shortness of breath, suicidal ideas, chest pain, dizziness and nausea.  Her past medical history is significant for depression.   ADD  Symptoms include abdominal pain (sporadic low abdomen).    Pertinent negative symptoms include no chest pain, no chills, no congestion, no cough, no diaphoresis, no fatigue, no fever, no myalgias, no nausea, no neck pain, no numbness, no rash, no sore throat, no swollen glands, no dysuria, no vomiting and no weakness.        The following portions of the patient's history were reviewed and updated as appropriate: allergies, current medications, past family history, past medical history, past social history, past surgical history, and problem list.    Review of Systems   Constitutional:  Negative for chills, diaphoresis, fatigue and fever.   HENT:  Negative for congestion, sore throat and swollen glands.    Respiratory:  Negative for cough, chest tightness and shortness of breath.    Cardiovascular:  Negative for chest pain.   Gastrointestinal:  Positive for abdominal pain (sporadic low abdomen). Negative for constipation, diarrhea, nausea and vomiting.   Genitourinary:  Negative for dysuria.   Musculoskeletal:  Negative for myalgias and neck pain.   Skin:  Negative for rash.   Neurological:  Negative for dizziness, weakness, numbness and headache.   Psychiatric/Behavioral:  Negative for self-injury, suicidal ideas, depressed mood and stress. The patient is not nervous/anxious.        Objective     /92 (BP Location: Left arm, Patient Position: Sitting, Cuff Size: Large Adult)   Pulse 96   Temp 98.6 °F (37 °C) (Tympanic)   Wt 96.2 kg (212 lb)   SpO2 97%   BMI  34.22 kg/m²     Current Outpatient Medications on File Prior to Visit   Medication Sig Dispense Refill    amLODIPine (NORVASC) 10 MG tablet TAKE 1 TABLET BY MOUTH DAILY 90 tablet 0    atomoxetine (Strattera) 80 MG capsule Take 1 capsule by mouth Daily. 90 capsule 1    atorvastatin (LIPITOR) 10 MG tablet TAKE 1 TABLET BY MOUTH DAILY 90 tablet 1    cetirizine (zyrTEC) 10 MG tablet Take 1 tablet by mouth Daily.      Continuous Blood Gluc Sensor (FreeStyle Dandy 2 Sensor) misc       Farxiga 10 MG tablet TAKE 1 TABLET BY MOUTH DAILY 90 tablet 1    hydrALAZINE (APRESOLINE) 25 MG tablet Take 1 tablet by mouth 2 (Two) Times a Day. 180 tablet 0    hydrOXYzine (ATARAX) 25 MG tablet Take 1 tablet by mouth 3 (Three) Times a Day As Needed for Anxiety. 30 tablet 0    ketoconazole (NIZORAL) 2 % shampoo       losartan (COZAAR) 100 MG tablet Take 1 tablet by mouth Daily. 90 tablet 1    metFORMIN ER (GLUCOPHAGE-XR) 500 MG 24 hr tablet TAKE 1 TABLET BY MOUTH TWICE DAILY 180 tablet 0    sertraline (ZOLOFT) 100 MG tablet TAKE 1 TABLET BY MOUTH DAILY 90 tablet 1    [DISCONTINUED] Mounjaro 7.5 MG/0.5ML solution pen-injector ADMINISTER 0.5 ML UNDER THE SKIN EVERY 7 DAYS       No current facility-administered medications on file prior to visit.        BMI is >= 30 and <35. (Class 1 Obesity). The following options were offered after discussion;: exercise counseling/recommendations and nutrition counseling/recommendations       Physical Exam  Constitutional:       General: She is not in acute distress.     Appearance: Normal appearance. She is not ill-appearing.   HENT:      Head: Normocephalic and atraumatic.   Eyes:      Extraocular Movements: Extraocular movements intact.   Cardiovascular:      Rate and Rhythm: Normal rate and regular rhythm.      Heart sounds: No murmur heard.  Pulmonary:      Effort: Pulmonary effort is normal. No respiratory distress.      Breath sounds: No wheezing or rales.   Neurological:      General: No focal  deficit present.      Mental Status: She is alert and oriented to person, place, and time.   Psychiatric:         Mood and Affect: Mood normal.         Behavior: Behavior normal.         Thought Content: Thought content normal.         Judgment: Judgment normal.           Assessment & Plan     Diagnoses and all orders for this visit:    1. Hyperlipidemia, unspecified hyperlipidemia type (Primary)  Comments:  stable  work on diet and exercise  check labs  cont statin  Orders:  -     Comprehensive Metabolic Panel; Future  -     Lipid Panel; Future    2. Essential hypertension  Comments:  elevated today  monitor at home  cont hydralazine, losartan, amd norvasc  Orders:  -     Comprehensive Metabolic Panel; Future  -     Lipid Panel; Future    3. Anxiety  Comments:  stable  cont zoloft  denies SI or HI    4. Attention deficit  Comments:  stable  cont straterra    5. Type 2 diabetes mellitus with other specified complication, without long-term current use of insulin  Comments:  stable  cont metformin, farxiga  stopped mounjaro- will restart if needed  check labs  sees endo  Orders:  -     Comprehensive Metabolic Panel; Future  -     Lipid Panel; Future  -     Hemoglobin A1c; Future  -     Microalbumin / Creatinine Urine Ratio - Urine, Clean Catch; Future

## 2025-02-27 DIAGNOSIS — I10 ESSENTIAL HYPERTENSION: ICD-10-CM

## 2025-02-27 RX ORDER — HYDRALAZINE HYDROCHLORIDE 25 MG/1
25 TABLET, FILM COATED ORAL 2 TIMES DAILY
Qty: 180 TABLET | Refills: 0 | Status: SHIPPED | OUTPATIENT
Start: 2025-02-27

## 2025-04-17 DIAGNOSIS — E11.69 TYPE 2 DIABETES MELLITUS WITH OTHER SPECIFIED COMPLICATION, WITHOUT LONG-TERM CURRENT USE OF INSULIN: ICD-10-CM

## 2025-04-17 DIAGNOSIS — R80.9 MICROALBUMINURIA: ICD-10-CM

## 2025-04-17 RX ORDER — AMLODIPINE BESYLATE 10 MG/1
10 TABLET ORAL DAILY
Qty: 90 TABLET | Refills: 0 | Status: SHIPPED | OUTPATIENT
Start: 2025-04-17

## 2025-04-17 RX ORDER — DAPAGLIFLOZIN 10 MG/1
1 TABLET, FILM COATED ORAL DAILY
Qty: 90 TABLET | Refills: 1 | Status: SHIPPED | OUTPATIENT
Start: 2025-04-17

## 2025-06-03 DIAGNOSIS — I10 ESSENTIAL HYPERTENSION: ICD-10-CM

## 2025-06-03 RX ORDER — HYDRALAZINE HYDROCHLORIDE 25 MG/1
25 TABLET, FILM COATED ORAL 2 TIMES DAILY
Qty: 180 TABLET | Refills: 0 | Status: SHIPPED | OUTPATIENT
Start: 2025-06-03

## 2025-06-17 RX ORDER — KETOCONAZOLE 20 MG/ML
SHAMPOO, SUSPENSION TOPICAL 2 TIMES WEEKLY
Qty: 120 ML | Refills: 2 | Status: SHIPPED | OUTPATIENT
Start: 2025-06-19

## 2025-07-16 RX ORDER — AMLODIPINE BESYLATE 10 MG/1
10 TABLET ORAL DAILY
Qty: 90 TABLET | Refills: 0 | Status: SHIPPED | OUTPATIENT
Start: 2025-07-16

## 2025-08-25 ENCOUNTER — OFFICE VISIT (OUTPATIENT)
Dept: FAMILY MEDICINE CLINIC | Facility: CLINIC | Age: 35
End: 2025-08-25
Payer: COMMERCIAL

## 2025-08-25 VITALS
SYSTOLIC BLOOD PRESSURE: 130 MMHG | WEIGHT: 206 LBS | OXYGEN SATURATION: 97 % | BODY MASS INDEX: 33.25 KG/M2 | TEMPERATURE: 97.7 F | HEART RATE: 95 BPM | DIASTOLIC BLOOD PRESSURE: 88 MMHG

## 2025-08-25 DIAGNOSIS — R41.840 ATTENTION DEFICIT: ICD-10-CM

## 2025-08-25 DIAGNOSIS — E78.5 HYPERLIPIDEMIA, UNSPECIFIED HYPERLIPIDEMIA TYPE: ICD-10-CM

## 2025-08-25 DIAGNOSIS — I10 ESSENTIAL HYPERTENSION: Primary | ICD-10-CM

## 2025-08-25 DIAGNOSIS — F41.9 ANXIETY: ICD-10-CM

## 2025-08-25 DIAGNOSIS — E11.69 TYPE 2 DIABETES MELLITUS WITH OTHER SPECIFIED COMPLICATION, WITHOUT LONG-TERM CURRENT USE OF INSULIN: ICD-10-CM

## 2025-08-25 PROCEDURE — 99214 OFFICE O/P EST MOD 30 MIN: CPT | Performed by: NURSE PRACTITIONER

## 2025-08-25 RX ORDER — ATOMOXETINE 80 MG/1
80 CAPSULE ORAL DAILY
Qty: 30 CAPSULE | Refills: 0 | Status: SHIPPED | OUTPATIENT
Start: 2025-08-25

## 2025-08-25 RX ORDER — SERTRALINE HYDROCHLORIDE 100 MG/1
150 TABLET, FILM COATED ORAL DAILY
Qty: 135 TABLET | Refills: 1 | Status: SHIPPED | OUTPATIENT
Start: 2025-08-25

## 2025-08-28 LAB
ALBUMIN SERPL-MCNC: 4.4 G/DL (ref 3.5–5.2)
ALBUMIN UR-MCNC: 11 MG/DL
ALBUMIN/GLOB SERPL: 1.3 G/DL
ALP SERPL-CCNC: 60 U/L (ref 39–117)
ALT SERPL W P-5'-P-CCNC: 29 U/L (ref 1–33)
ANION GAP SERPL CALCULATED.3IONS-SCNC: 14.3 MMOL/L (ref 5–15)
AST SERPL-CCNC: 24 U/L (ref 1–32)
BILIRUB SERPL-MCNC: 0.4 MG/DL (ref 0–1.2)
BUN SERPL-MCNC: 8 MG/DL (ref 6–20)
BUN/CREAT SERPL: 15.1 (ref 7–25)
CALCIUM SPEC-SCNC: 9.2 MG/DL (ref 8.6–10.5)
CHLORIDE SERPL-SCNC: 104 MMOL/L (ref 98–107)
CHOLEST SERPL-MCNC: 153 MG/DL (ref 0–200)
CO2 SERPL-SCNC: 22.7 MMOL/L (ref 22–29)
CREAT SERPL-MCNC: 0.53 MG/DL (ref 0.57–1)
CREAT UR-MCNC: 43.9 MG/DL
EGFRCR SERPLBLD CKD-EPI 2021: 123.9 ML/MIN/1.73
GLOBULIN UR ELPH-MCNC: 3.4 GM/DL
GLUCOSE SERPL-MCNC: 189 MG/DL (ref 65–99)
HDLC SERPL-MCNC: 48 MG/DL (ref 40–60)
LDLC SERPL CALC-MCNC: 79 MG/DL (ref 0–100)
LDLC/HDLC SERPL: 1.57 {RATIO}
MICROALBUMIN/CREAT UR: 250.6 MG/G (ref 0–29)
POTASSIUM SERPL-SCNC: 4.2 MMOL/L (ref 3.5–5.2)
PROT SERPL-MCNC: 7.8 G/DL (ref 6–8.5)
SODIUM SERPL-SCNC: 141 MMOL/L (ref 136–145)
TRIGL SERPL-MCNC: 148 MG/DL (ref 0–150)
VLDLC SERPL-MCNC: 26 MG/DL (ref 5–40)

## 2025-08-28 PROCEDURE — 36415 COLL VENOUS BLD VENIPUNCTURE: CPT | Performed by: NURSE PRACTITIONER

## 2025-08-28 PROCEDURE — 82043 UR ALBUMIN QUANTITATIVE: CPT | Performed by: NURSE PRACTITIONER

## 2025-08-28 PROCEDURE — 80053 COMPREHEN METABOLIC PANEL: CPT | Performed by: NURSE PRACTITIONER

## 2025-08-28 PROCEDURE — 82570 ASSAY OF URINE CREATININE: CPT | Performed by: NURSE PRACTITIONER

## 2025-08-28 PROCEDURE — 83036 HEMOGLOBIN GLYCOSYLATED A1C: CPT | Performed by: NURSE PRACTITIONER

## 2025-08-28 PROCEDURE — 80061 LIPID PANEL: CPT | Performed by: NURSE PRACTITIONER
